# Patient Record
Sex: MALE | Race: WHITE | Employment: FULL TIME | URBAN - METROPOLITAN AREA
[De-identification: names, ages, dates, MRNs, and addresses within clinical notes are randomized per-mention and may not be internally consistent; named-entity substitution may affect disease eponyms.]

---

## 2018-12-30 ENCOUNTER — APPOINTMENT (OUTPATIENT)
Dept: GENERAL RADIOLOGY | Age: 53
End: 2018-12-30
Payer: COMMERCIAL

## 2018-12-30 ENCOUNTER — HOSPITAL ENCOUNTER (EMERGENCY)
Age: 53
Discharge: HOME | End: 2018-12-30
Attending: EMERGENCY MEDICINE
Payer: COMMERCIAL

## 2018-12-30 VITALS
WEIGHT: 240 LBS | HEIGHT: 72 IN | BODY MASS INDEX: 32.51 KG/M2 | OXYGEN SATURATION: 95 % | TEMPERATURE: 98.2 F | SYSTOLIC BLOOD PRESSURE: 122 MMHG | HEART RATE: 60 BPM | RESPIRATION RATE: 16 BRPM | DIASTOLIC BLOOD PRESSURE: 76 MMHG

## 2018-12-30 DIAGNOSIS — R07.9 CHEST PAIN, UNSPECIFIED TYPE: Primary | ICD-10-CM

## 2018-12-30 LAB
ALBUMIN SERPL-MCNC: 4.9 G/DL (ref 3.5–5.2)
ALP BLD-CCNC: 63 U/L (ref 40–129)
ALT SERPL-CCNC: 47 U/L (ref 0–40)
ANION GAP SERPL CALCULATED.3IONS-SCNC: 11 MMOL/L (ref 7–16)
AST SERPL-CCNC: 30 U/L (ref 0–39)
BASOPHILS ABSOLUTE: 0.05 E9/L (ref 0–0.2)
BASOPHILS RELATIVE PERCENT: 0.5 % (ref 0–2)
BILIRUB SERPL-MCNC: 0.4 MG/DL (ref 0–1.2)
BUN BLDV-MCNC: 13 MG/DL (ref 6–20)
CALCIUM SERPL-MCNC: 9.3 MG/DL (ref 8.6–10.2)
CHLORIDE BLD-SCNC: 105 MMOL/L (ref 98–107)
CO2: 25 MMOL/L (ref 22–29)
CREAT SERPL-MCNC: 0.8 MG/DL (ref 0.7–1.2)
D DIMER: <200 NG/ML DDU
EOSINOPHILS ABSOLUTE: 0.2 E9/L (ref 0.05–0.5)
EOSINOPHILS RELATIVE PERCENT: 2.1 % (ref 0–6)
GFR AFRICAN AMERICAN: >60
GFR NON-AFRICAN AMERICAN: >60 ML/MIN/1.73
GLUCOSE BLD-MCNC: 83 MG/DL (ref 74–99)
HCT VFR BLD CALC: 49 % (ref 37–54)
HEMOGLOBIN: 16.7 G/DL (ref 12.5–16.5)
IMMATURE GRANULOCYTES #: 0.03 E9/L
IMMATURE GRANULOCYTES %: 0.3 % (ref 0–5)
LYMPHOCYTES ABSOLUTE: 2.83 E9/L (ref 1.5–4)
LYMPHOCYTES RELATIVE PERCENT: 30 % (ref 20–42)
MCH RBC QN AUTO: 30.6 PG (ref 26–35)
MCHC RBC AUTO-ENTMCNC: 34.1 % (ref 32–34.5)
MCV RBC AUTO: 89.7 FL (ref 80–99.9)
MONOCYTES ABSOLUTE: 0.74 E9/L (ref 0.1–0.95)
MONOCYTES RELATIVE PERCENT: 7.8 % (ref 2–12)
NEUTROPHILS ABSOLUTE: 5.58 E9/L (ref 1.8–7.3)
NEUTROPHILS RELATIVE PERCENT: 59.3 % (ref 43–80)
PDW BLD-RTO: 14.3 FL (ref 11.5–15)
PLATELET # BLD: 229 E9/L (ref 130–450)
PMV BLD AUTO: 9.4 FL (ref 7–12)
POTASSIUM SERPL-SCNC: 4.1 MMOL/L (ref 3.5–5)
PRO-BNP: 11 PG/ML (ref 0–125)
RBC # BLD: 5.46 E12/L (ref 3.8–5.8)
SODIUM BLD-SCNC: 141 MMOL/L (ref 132–146)
TOTAL PROTEIN: 6.9 G/DL (ref 6.4–8.3)
TROPONIN: <0.01 NG/ML (ref 0–0.03)
TROPONIN: <0.01 NG/ML (ref 0–0.03)
WBC # BLD: 9.4 E9/L (ref 4.5–11.5)

## 2018-12-30 PROCEDURE — 96374 THER/PROPH/DIAG INJ IV PUSH: CPT

## 2018-12-30 PROCEDURE — 6360000002 HC RX W HCPCS: Performed by: EMERGENCY MEDICINE

## 2018-12-30 PROCEDURE — 85378 FIBRIN DEGRADE SEMIQUANT: CPT

## 2018-12-30 PROCEDURE — 80053 COMPREHEN METABOLIC PANEL: CPT

## 2018-12-30 PROCEDURE — 36415 COLL VENOUS BLD VENIPUNCTURE: CPT

## 2018-12-30 PROCEDURE — 83880 ASSAY OF NATRIURETIC PEPTIDE: CPT

## 2018-12-30 PROCEDURE — 6370000000 HC RX 637 (ALT 250 FOR IP): Performed by: EMERGENCY MEDICINE

## 2018-12-30 PROCEDURE — 84484 ASSAY OF TROPONIN QUANT: CPT

## 2018-12-30 PROCEDURE — 71045 X-RAY EXAM CHEST 1 VIEW: CPT

## 2018-12-30 PROCEDURE — 99284 EMERGENCY DEPT VISIT MOD MDM: CPT

## 2018-12-30 PROCEDURE — 85025 COMPLETE CBC W/AUTO DIFF WBC: CPT

## 2018-12-30 RX ORDER — ASPIRIN 325 MG
325 TABLET ORAL ONCE
Status: COMPLETED | OUTPATIENT
Start: 2018-12-30 | End: 2018-12-30

## 2018-12-30 RX ORDER — KETOROLAC TROMETHAMINE 30 MG/ML
15 INJECTION, SOLUTION INTRAMUSCULAR; INTRAVENOUS ONCE
Status: COMPLETED | OUTPATIENT
Start: 2018-12-30 | End: 2018-12-30

## 2018-12-30 RX ADMIN — ASPIRIN 325 MG: 325 TABLET ORAL at 19:12

## 2018-12-30 RX ADMIN — KETOROLAC TROMETHAMINE 15 MG: 30 INJECTION, SOLUTION INTRAMUSCULAR at 21:15

## 2018-12-30 ASSESSMENT — PAIN DESCRIPTION - LOCATION: LOCATION: CHEST

## 2018-12-30 ASSESSMENT — PAIN DESCRIPTION - DESCRIPTORS: DESCRIPTORS: ACHING

## 2018-12-30 ASSESSMENT — PAIN DESCRIPTION - PAIN TYPE: TYPE: ACUTE PAIN

## 2018-12-30 ASSESSMENT — PAIN SCALES - GENERAL
PAINLEVEL_OUTOF10: 0
PAINLEVEL_OUTOF10: 3
PAINLEVEL_OUTOF10: 2

## 2019-01-05 LAB
EKG ATRIAL RATE: 62 BPM
EKG ATRIAL RATE: 65 BPM
EKG P AXIS: 44 DEGREES
EKG P AXIS: 58 DEGREES
EKG P-R INTERVAL: 148 MS
EKG P-R INTERVAL: 150 MS
EKG Q-T INTERVAL: 392 MS
EKG Q-T INTERVAL: 392 MS
EKG QRS DURATION: 92 MS
EKG QRS DURATION: 92 MS
EKG QTC CALCULATION (BAZETT): 397 MS
EKG QTC CALCULATION (BAZETT): 407 MS
EKG R AXIS: -12 DEGREES
EKG R AXIS: -17 DEGREES
EKG T AXIS: 30 DEGREES
EKG T AXIS: 42 DEGREES
EKG VENTRICULAR RATE: 62 BPM
EKG VENTRICULAR RATE: 65 BPM

## 2019-09-21 ENCOUNTER — HOSPITAL ENCOUNTER (OUTPATIENT)
Age: 54
Discharge: HOME | End: 2019-09-23
Payer: COMMERCIAL

## 2019-09-21 LAB
ALT SERPL-CCNC: 36 U/L (ref 0–40)
ANION GAP SERPL CALCULATED.3IONS-SCNC: 16 MMOL/L (ref 7–16)
AST SERPL-CCNC: 30 U/L (ref 0–39)
BACTERIA: ABNORMAL /HPF
BASOPHILS ABSOLUTE: 0.05 E9/L (ref 0–0.2)
BASOPHILS RELATIVE PERCENT: 0.9 % (ref 0–2)
BILIRUBIN URINE: NEGATIVE
BLOOD, URINE: NEGATIVE
BUN BLDV-MCNC: 15 MG/DL (ref 6–20)
CALCIUM SERPL-MCNC: 9.2 MG/DL (ref 8.6–10.2)
CHLORIDE BLD-SCNC: 101 MMOL/L (ref 98–107)
CHOLESTEROL, TOTAL: 168 MG/DL (ref 0–199)
CLARITY: ABNORMAL
CO2: 24 MMOL/L (ref 22–29)
COLOR: YELLOW
CREAT SERPL-MCNC: 1.1 MG/DL (ref 0.7–1.2)
EOSINOPHILS ABSOLUTE: 0.15 E9/L (ref 0.05–0.5)
EOSINOPHILS RELATIVE PERCENT: 2.7 % (ref 0–6)
GFR AFRICAN AMERICAN: >60
GFR NON-AFRICAN AMERICAN: >60 ML/MIN/1.73
GLUCOSE BLD-MCNC: 108 MG/DL (ref 74–99)
GLUCOSE URINE: NEGATIVE MG/DL
HBA1C MFR BLD: 5.9 % (ref 4–5.6)
HCT VFR BLD CALC: 52.5 % (ref 37–54)
HDLC SERPL-MCNC: 54 MG/DL
HEMOGLOBIN: 16.9 G/DL (ref 12.5–16.5)
IMMATURE GRANULOCYTES #: 0.02 E9/L
IMMATURE GRANULOCYTES %: 0.4 % (ref 0–5)
KETONES, URINE: NEGATIVE MG/DL
LDL CHOLESTEROL CALCULATED: 89 MG/DL (ref 0–99)
LEUKOCYTE ESTERASE, URINE: NEGATIVE
LYMPHOCYTES ABSOLUTE: 1.84 E9/L (ref 1.5–4)
LYMPHOCYTES RELATIVE PERCENT: 32.7 % (ref 20–42)
MCH RBC QN AUTO: 29.8 PG (ref 26–35)
MCHC RBC AUTO-ENTMCNC: 32.2 % (ref 32–34.5)
MCV RBC AUTO: 92.4 FL (ref 80–99.9)
MONOCYTES ABSOLUTE: 0.61 E9/L (ref 0.1–0.95)
MONOCYTES RELATIVE PERCENT: 10.9 % (ref 2–12)
NEUTROPHILS ABSOLUTE: 2.95 E9/L (ref 1.8–7.3)
NEUTROPHILS RELATIVE PERCENT: 52.4 % (ref 43–80)
NITRITE, URINE: NEGATIVE
PDW BLD-RTO: 15 FL (ref 11.5–15)
PH UA: 5 (ref 5–9)
PLATELET # BLD: 240 E9/L (ref 130–450)
PMV BLD AUTO: 10 FL (ref 7–12)
POTASSIUM SERPL-SCNC: 4.2 MMOL/L (ref 3.5–5)
PROSTATE SPECIFIC ANTIGEN: 0.41 NG/ML (ref 0–4)
PROTEIN UA: NEGATIVE MG/DL
RBC # BLD: 5.68 E12/L (ref 3.8–5.8)
RBC UA: ABNORMAL /HPF (ref 0–2)
SODIUM BLD-SCNC: 141 MMOL/L (ref 132–146)
SPECIFIC GRAVITY UA: 1.02 (ref 1–1.03)
TRIGL SERPL-MCNC: 126 MG/DL (ref 0–149)
UROBILINOGEN, URINE: 0.2 E.U./DL
VITAMIN D 25-HYDROXY: 58 NG/ML (ref 30–100)
VLDLC SERPL CALC-MCNC: 25 MG/DL
WBC # BLD: 5.6 E9/L (ref 4.5–11.5)
WBC UA: ABNORMAL /HPF (ref 0–5)

## 2019-09-21 PROCEDURE — 84460 ALANINE AMINO (ALT) (SGPT): CPT

## 2019-09-21 PROCEDURE — 84403 ASSAY OF TOTAL TESTOSTERONE: CPT

## 2019-09-21 PROCEDURE — 85025 COMPLETE CBC W/AUTO DIFF WBC: CPT

## 2019-09-21 PROCEDURE — 36415 COLL VENOUS BLD VENIPUNCTURE: CPT

## 2019-09-21 PROCEDURE — 84450 TRANSFERASE (AST) (SGOT): CPT

## 2019-09-21 PROCEDURE — G0103 PSA SCREENING: HCPCS

## 2019-09-21 PROCEDURE — 81001 URINALYSIS AUTO W/SCOPE: CPT

## 2019-09-21 PROCEDURE — 80061 LIPID PANEL: CPT

## 2019-09-21 PROCEDURE — 82306 VITAMIN D 25 HYDROXY: CPT

## 2019-09-21 PROCEDURE — 80048 BASIC METABOLIC PNL TOTAL CA: CPT

## 2019-09-21 PROCEDURE — 83036 HEMOGLOBIN GLYCOSYLATED A1C: CPT

## 2019-09-21 PROCEDURE — 84270 ASSAY OF SEX HORMONE GLOBUL: CPT

## 2019-09-24 LAB
SEX HORMONE BINDING GLOBULIN: 31 NMOL/L (ref 11–80)
TESTOSTERONE FREE-NONMALE: 128.6 PG/ML (ref 47–244)
TESTOSTERONE TOTAL: 577 NG/DL (ref 220–1000)

## 2019-09-30 ENCOUNTER — OFFICE VISIT (OUTPATIENT)
Dept: FAMILY MEDICINE CLINIC | Age: 54
End: 2019-09-30
Payer: COMMERCIAL

## 2019-09-30 VITALS
DIASTOLIC BLOOD PRESSURE: 78 MMHG | BODY MASS INDEX: 30.75 KG/M2 | HEIGHT: 72 IN | TEMPERATURE: 98.7 F | OXYGEN SATURATION: 98 % | HEART RATE: 81 BPM | SYSTOLIC BLOOD PRESSURE: 124 MMHG | WEIGHT: 227 LBS

## 2019-09-30 DIAGNOSIS — R73.01 IMPAIRED FASTING GLUCOSE: ICD-10-CM

## 2019-09-30 DIAGNOSIS — K21.9 GASTROESOPHAGEAL REFLUX DISEASE, ESOPHAGITIS PRESENCE NOT SPECIFIED: ICD-10-CM

## 2019-09-30 DIAGNOSIS — M54.12 CERVICAL RADICULOPATHY: ICD-10-CM

## 2019-09-30 DIAGNOSIS — E34.9 TESTOSTERONE DEFICIENCY: ICD-10-CM

## 2019-09-30 DIAGNOSIS — Z80.42 FAMILY HISTORY OF PROSTATE CANCER: ICD-10-CM

## 2019-09-30 DIAGNOSIS — E78.2 MIXED HYPERLIPIDEMIA: Primary | ICD-10-CM

## 2019-09-30 PROCEDURE — 99214 OFFICE O/P EST MOD 30 MIN: CPT | Performed by: FAMILY MEDICINE

## 2019-09-30 PROCEDURE — G8417 CALC BMI ABV UP PARAM F/U: HCPCS | Performed by: FAMILY MEDICINE

## 2019-09-30 PROCEDURE — 1036F TOBACCO NON-USER: CPT | Performed by: FAMILY MEDICINE

## 2019-09-30 PROCEDURE — G8427 DOCREV CUR MEDS BY ELIG CLIN: HCPCS | Performed by: FAMILY MEDICINE

## 2019-09-30 PROCEDURE — 3017F COLORECTAL CA SCREEN DOC REV: CPT | Performed by: FAMILY MEDICINE

## 2019-09-30 PROCEDURE — 93000 ELECTROCARDIOGRAM COMPLETE: CPT | Performed by: FAMILY MEDICINE

## 2019-09-30 RX ORDER — BACLOFEN 10 MG/1
10 TABLET ORAL 4 TIMES DAILY
Qty: 120 TABLET | Refills: 11 | Status: SHIPPED
Start: 2019-09-30 | End: 2020-03-19 | Stop reason: SDUPTHER

## 2019-09-30 RX ORDER — FAMOTIDINE 20 MG/1
20 TABLET, FILM COATED ORAL DAILY
Qty: 90 TABLET | Refills: 3 | Status: SHIPPED
Start: 2019-09-30 | End: 2020-05-08

## 2019-09-30 RX ORDER — TESTOSTERONE CYPIONATE 100 MG/ML
100 INJECTION, SOLUTION INTRAMUSCULAR
COMMUNITY
End: 2019-09-30 | Stop reason: SDUPTHER

## 2019-09-30 RX ORDER — ROSUVASTATIN CALCIUM 5 MG/1
5 TABLET, COATED ORAL DAILY
Qty: 90 TABLET | Refills: 1 | Status: SHIPPED
Start: 2019-09-30 | End: 2020-03-19 | Stop reason: SDUPTHER

## 2019-09-30 ASSESSMENT — ENCOUNTER SYMPTOMS
ABDOMINAL PAIN: 0
WHEEZING: 0
EYE ITCHING: 0
EYE REDNESS: 0
EYE PAIN: 0
CHEST TIGHTNESS: 0
ABDOMINAL DISTENTION: 0
DIARRHEA: 0
BLOOD IN STOOL: 0
BACK PAIN: 0
COUGH: 0
NAUSEA: 0
COLOR CHANGE: 0
SORE THROAT: 0
PHOTOPHOBIA: 0
CONSTIPATION: 0
SHORTNESS OF BREATH: 0
SINUS PAIN: 0
VOMITING: 0
SINUS PRESSURE: 0

## 2019-09-30 ASSESSMENT — PATIENT HEALTH QUESTIONNAIRE - PHQ9
SUM OF ALL RESPONSES TO PHQ QUESTIONS 1-9: 0
SUM OF ALL RESPONSES TO PHQ9 QUESTIONS 1 & 2: 0
2. FEELING DOWN, DEPRESSED OR HOPELESS: 0
SUM OF ALL RESPONSES TO PHQ QUESTIONS 1-9: 0
1. LITTLE INTEREST OR PLEASURE IN DOING THINGS: 0

## 2019-09-30 NOTE — PROGRESS NOTES
Activity    Alcohol use: Yes     Alcohol/week: 0.0 standard drinks     Comment: socially    Drug use: No    Sexual activity: Not on file   Lifestyle    Physical activity:     Days per week: Not on file     Minutes per session: Not on file    Stress: Not on file   Relationships    Social connections:     Talks on phone: Not on file     Gets together: Not on file     Attends [employer]ology service: Not on file     Active member of club or organization: Not on file     Attends meetings of clubs or organizations: Not on file     Relationship status: Not on file    Intimate partner violence:     Fear of current or ex partner: Not on file     Emotionally abused: Not on file     Physically abused: Not on file     Forced sexual activity: Not on file   Other Topics Concern    Not on file   Social History Narrative    Not on file       Vitals:    09/30/19 1613   BP: 124/78   Pulse: 81   Temp: 98.7 °F (37.1 °C)   SpO2: 98%   Weight: 227 lb (103 kg)   Height: 6' (1.829 m)       Exam:  Physical Exam   Constitutional: He is oriented to person, place, and time. He appears well-developed and well-nourished. obesity   HENT:   Head: Normocephalic and atraumatic. Right Ear: External ear normal.   Left Ear: External ear normal.   Nose: Nose normal.   Mouth/Throat: Oropharynx is clear and moist.   Eyes: Pupils are equal, round, and reactive to light. Conjunctivae and EOM are normal.   Neck: Normal range of motion. Neck supple. Cardiovascular: Normal rate, regular rhythm, normal heart sounds and intact distal pulses. Pulmonary/Chest: Effort normal and breath sounds normal.   Abdominal: Soft. Bowel sounds are normal.   Musculoskeletal: Normal range of motion. Neurological: He is alert and oriented to person, place, and time. Skin: Skin is warm and dry. No rash noted. Psychiatric: He has a normal mood and affect.        Assessment and Plan:    Problem List        Digestive    GERD (gastroesophageal reflux disease) Relevant Medications    famotidine (PEPCID) 20 MG tablet    Other Relevant Orders    Comprehensive Metabolic Panel       Endocrine    Impaired fasting glucose    Relevant Orders    CBC Auto Differential    Hemoglobin A1C    Microalbumin / Creatinine Urine Ratio       Nervous and Auditory    Cervical radiculopathy    Relevant Medications    baclofen (LIORESAL) 10 MG tablet       Other    Hyperlipidemia - Primary    Relevant Medications    aspirin 81 MG tablet    aspirin tablet 325 mg (Completed)    rosuvastatin (CRESTOR) 5 MG tablet    Other Relevant Orders    CBC Auto Differential    Comprehensive Metabolic Panel    Lipid Panel    Microalbumin / Creatinine Urine Ratio    Urinalysis    Family history of prostate cancer    Testosterone deficiency    Relevant Orders    Testosterone, free, total      utd colonoscopy  Has had flu vaccine  utd psa  Reviewed all bw as discussed above  change zantac to pepcid due to recent study  Refilled testosterone, oarrs reviewed  Recommended diet and exercise     Return in about 6 months (around 3/30/2020) for fasting bw prior.       Orders Placed This Encounter   Procedures    CBC Auto Differential     Standing Status:   Future     Standing Expiration Date:   9/30/2020    Comprehensive Metabolic Panel     Standing Status:   Future     Standing Expiration Date:   9/30/2020    Lipid Panel     Standing Status:   Future     Standing Expiration Date:   9/30/2020     Order Specific Question:   Is Patient Fasting?/# of Hours     Answer:   12    Hemoglobin A1C     Standing Status:   Future     Standing Expiration Date:   9/30/2020    Microalbumin / Creatinine Urine Ratio     Standing Status:   Future     Standing Expiration Date:   9/30/2020    Urinalysis     Standing Status:   Future     Standing Expiration Date:   9/30/2020    Testosterone, free, total     Standing Status:   Future     Standing Expiration Date:   9/30/2020       Taurus Baum MD  9/30/2019  4:32 PM

## 2019-10-01 RX ORDER — TESTOSTERONE CYPIONATE 100 MG/ML
100 INJECTION, SOLUTION INTRAMUSCULAR
Qty: 2 ML | Refills: 5 | Status: SHIPPED
Start: 2019-10-01 | End: 2020-03-19 | Stop reason: SDUPTHER

## 2020-03-14 ENCOUNTER — HOSPITAL ENCOUNTER (OUTPATIENT)
Age: 55
Discharge: HOME | End: 2020-03-16
Payer: COMMERCIAL

## 2020-03-14 LAB
ALBUMIN SERPL-MCNC: 4.6 G/DL (ref 3.5–5.2)
ALP BLD-CCNC: 57 U/L (ref 40–129)
ALT SERPL-CCNC: 33 U/L (ref 0–40)
ANION GAP SERPL CALCULATED.3IONS-SCNC: 13 MMOL/L (ref 7–16)
AST SERPL-CCNC: 23 U/L (ref 0–39)
BASOPHILS ABSOLUTE: 0.05 E9/L (ref 0–0.2)
BASOPHILS RELATIVE PERCENT: 0.9 % (ref 0–2)
BILIRUB SERPL-MCNC: 0.4 MG/DL (ref 0–1.2)
BILIRUBIN URINE: NEGATIVE
BLOOD, URINE: NEGATIVE
BUN BLDV-MCNC: 14 MG/DL (ref 6–20)
CALCIUM SERPL-MCNC: 9.8 MG/DL (ref 8.6–10.2)
CHLORIDE BLD-SCNC: 101 MMOL/L (ref 98–107)
CHOLESTEROL, TOTAL: 167 MG/DL (ref 0–199)
CLARITY: CLEAR
CO2: 28 MMOL/L (ref 22–29)
COLOR: YELLOW
CREAT SERPL-MCNC: 1.1 MG/DL (ref 0.7–1.2)
CREATININE URINE: 197 MG/DL (ref 40–278)
EOSINOPHILS ABSOLUTE: 0.17 E9/L (ref 0.05–0.5)
EOSINOPHILS RELATIVE PERCENT: 2.9 % (ref 0–6)
GFR AFRICAN AMERICAN: >60
GFR NON-AFRICAN AMERICAN: >60 ML/MIN/1.73
GLUCOSE BLD-MCNC: 108 MG/DL (ref 74–99)
GLUCOSE URINE: NEGATIVE MG/DL
HBA1C MFR BLD: 5.9 % (ref 4–5.6)
HCT VFR BLD CALC: 53.2 % (ref 37–54)
HDLC SERPL-MCNC: 52 MG/DL
HEMOGLOBIN: 17 G/DL (ref 12.5–16.5)
IMMATURE GRANULOCYTES #: 0.02 E9/L
IMMATURE GRANULOCYTES %: 0.3 % (ref 0–5)
KETONES, URINE: NEGATIVE MG/DL
LDL CHOLESTEROL CALCULATED: 85 MG/DL (ref 0–99)
LEUKOCYTE ESTERASE, URINE: NEGATIVE
LYMPHOCYTES ABSOLUTE: 1.99 E9/L (ref 1.5–4)
LYMPHOCYTES RELATIVE PERCENT: 34.1 % (ref 20–42)
MCH RBC QN AUTO: 29.5 PG (ref 26–35)
MCHC RBC AUTO-ENTMCNC: 32 % (ref 32–34.5)
MCV RBC AUTO: 92.4 FL (ref 80–99.9)
MICROALBUMIN UR-MCNC: <12 MG/L
MICROALBUMIN/CREAT UR-RTO: ABNORMAL (ref 0–30)
MONOCYTES ABSOLUTE: 0.6 E9/L (ref 0.1–0.95)
MONOCYTES RELATIVE PERCENT: 10.3 % (ref 2–12)
NEUTROPHILS ABSOLUTE: 3.01 E9/L (ref 1.8–7.3)
NEUTROPHILS RELATIVE PERCENT: 51.5 % (ref 43–80)
NITRITE, URINE: NEGATIVE
PDW BLD-RTO: 14.2 FL (ref 11.5–15)
PH UA: 5 (ref 5–9)
PLATELET # BLD: 225 E9/L (ref 130–450)
PMV BLD AUTO: 10 FL (ref 7–12)
POTASSIUM SERPL-SCNC: 4.3 MMOL/L (ref 3.5–5)
PROTEIN UA: NEGATIVE MG/DL
RBC # BLD: 5.76 E12/L (ref 3.8–5.8)
SODIUM BLD-SCNC: 142 MMOL/L (ref 132–146)
SPECIFIC GRAVITY UA: >=1.03 (ref 1–1.03)
TOTAL PROTEIN: 6.8 G/DL (ref 6.4–8.3)
TRIGL SERPL-MCNC: 151 MG/DL (ref 0–149)
UROBILINOGEN, URINE: 0.2 E.U./DL
VLDLC SERPL CALC-MCNC: 30 MG/DL
WBC # BLD: 5.8 E9/L (ref 4.5–11.5)

## 2020-03-14 PROCEDURE — 85025 COMPLETE CBC W/AUTO DIFF WBC: CPT

## 2020-03-14 PROCEDURE — 81003 URINALYSIS AUTO W/O SCOPE: CPT

## 2020-03-14 PROCEDURE — 82044 UR ALBUMIN SEMIQUANTITATIVE: CPT

## 2020-03-14 PROCEDURE — 84270 ASSAY OF SEX HORMONE GLOBUL: CPT

## 2020-03-14 PROCEDURE — 80061 LIPID PANEL: CPT

## 2020-03-14 PROCEDURE — 82570 ASSAY OF URINE CREATININE: CPT

## 2020-03-14 PROCEDURE — 83036 HEMOGLOBIN GLYCOSYLATED A1C: CPT

## 2020-03-14 PROCEDURE — 80053 COMPREHEN METABOLIC PANEL: CPT

## 2020-03-14 PROCEDURE — 84403 ASSAY OF TOTAL TESTOSTERONE: CPT

## 2020-03-14 PROCEDURE — 36415 COLL VENOUS BLD VENIPUNCTURE: CPT

## 2020-03-17 LAB
SEX HORMONE BINDING GLOBULIN: 29 NMOL/L (ref 11–80)
TESTOSTERONE FREE-NONMALE: 64.7 PG/ML (ref 47–244)
TESTOSTERONE TOTAL: 305 NG/DL (ref 220–1000)

## 2020-03-19 RX ORDER — ROSUVASTATIN CALCIUM 5 MG/1
5 TABLET, COATED ORAL DAILY
Qty: 90 TABLET | Refills: 1 | Status: SHIPPED | OUTPATIENT
Start: 2020-03-19

## 2020-03-19 RX ORDER — BACLOFEN 10 MG/1
10 TABLET ORAL 4 TIMES DAILY
Qty: 120 TABLET | Refills: 11 | Status: SHIPPED | OUTPATIENT
Start: 2020-03-19

## 2020-03-19 RX ORDER — TESTOSTERONE CYPIONATE 100 MG/ML
100 INJECTION, SOLUTION INTRAMUSCULAR
Qty: 2 ML | Refills: 5 | Status: SHIPPED | OUTPATIENT
Start: 2020-03-19 | End: 2021-05-03

## 2020-03-19 NOTE — TELEPHONE ENCOUNTER
I sent what was que'd. I looked through his notes and it's been several years since he's been on the omeprazole - I couldn't find it. Does he mean randitidine?

## 2020-03-19 NOTE — TELEPHONE ENCOUNTER
Patient needs pended meds refilled. He is asking for Omeprazole instead of the Famotidine he states the Omeprazole does not work for him.

## 2020-03-20 ENCOUNTER — TELEPHONE (OUTPATIENT)
Dept: FAMILY MEDICINE CLINIC | Age: 55
End: 2020-03-20

## 2020-03-20 RX ORDER — OMEPRAZOLE 10 MG/1
10 CAPSULE, DELAYED RELEASE ORAL DAILY
COMMUNITY
End: 2020-03-20 | Stop reason: SDUPTHER

## 2020-03-20 RX ORDER — OMEPRAZOLE 10 MG/1
10 CAPSULE, DELAYED RELEASE ORAL DAILY
Qty: 90 CAPSULE | Refills: 0 | Status: SHIPPED
Start: 2020-03-20 | End: 2020-05-08

## 2020-03-20 RX ORDER — OMEPRAZOLE 10 MG/1
10 CAPSULE, DELAYED RELEASE ORAL DAILY
Qty: 30 CAPSULE | Refills: 0 | Status: SHIPPED
Start: 2020-03-20 | End: 2020-03-20

## 2020-05-08 RX ORDER — NICOTINE POLACRILEX 4 MG/1
20 GUM, CHEWING ORAL DAILY
COMMUNITY
End: 2020-05-08 | Stop reason: SDUPTHER

## 2020-05-08 RX ORDER — NICOTINE POLACRILEX 4 MG/1
20 GUM, CHEWING ORAL DAILY
Qty: 90 TABLET | Refills: 0 | Status: SHIPPED
Start: 2020-05-08 | End: 2020-08-04

## 2020-05-12 ENCOUNTER — TELEPHONE (OUTPATIENT)
Dept: FAMILY MEDICINE CLINIC | Age: 55
End: 2020-05-12

## 2020-05-12 NOTE — TELEPHONE ENCOUNTER
Patient notified. He does not wish to schedule at this time. States he will schedule again when he is due for more refills.

## 2020-08-04 RX ORDER — OMEPRAZOLE 20 MG/1
CAPSULE, DELAYED RELEASE ORAL
Qty: 90 CAPSULE | Refills: 0 | Status: SHIPPED | OUTPATIENT
Start: 2020-08-04

## 2020-08-17 ENCOUNTER — OFFICE VISIT (OUTPATIENT)
Dept: PODIATRY | Age: 55
End: 2020-08-17
Payer: COMMERCIAL

## 2020-08-17 VITALS
DIASTOLIC BLOOD PRESSURE: 74 MMHG | HEIGHT: 72 IN | SYSTOLIC BLOOD PRESSURE: 138 MMHG | TEMPERATURE: 98.2 F | BODY MASS INDEX: 31.02 KG/M2 | WEIGHT: 229 LBS

## 2020-08-17 PROCEDURE — 99203 OFFICE O/P NEW LOW 30 MIN: CPT | Performed by: PODIATRIST

## 2020-08-17 PROCEDURE — 20550 NJX 1 TENDON SHEATH/LIGAMENT: CPT | Performed by: PODIATRIST

## 2020-08-17 PROCEDURE — L4396 STATIC OR DYNAMI AFO PRE CST: HCPCS | Performed by: PODIATRIST

## 2020-08-17 RX ORDER — BUPIVACAINE HYDROCHLORIDE 5 MG/ML
1 INJECTION, SOLUTION PERINEURAL ONCE
Status: COMPLETED | OUTPATIENT
Start: 2020-08-17 | End: 2020-08-17

## 2020-08-17 RX ORDER — BETAMETHASONE SODIUM PHOSPHATE AND BETAMETHASONE ACETATE 3; 3 MG/ML; MG/ML
6 INJECTION, SUSPENSION INTRA-ARTICULAR; INTRALESIONAL; INTRAMUSCULAR; SOFT TISSUE ONCE
Status: COMPLETED | OUTPATIENT
Start: 2020-08-17 | End: 2020-08-17

## 2020-08-17 RX ADMIN — BETAMETHASONE SODIUM PHOSPHATE AND BETAMETHASONE ACETATE 6 MG: 3; 3 INJECTION, SUSPENSION INTRA-ARTICULAR; INTRALESIONAL; INTRAMUSCULAR; SOFT TISSUE at 17:27

## 2020-08-17 RX ADMIN — BUPIVACAINE HYDROCHLORIDE 5 MG: 5 INJECTION, SOLUTION PERINEURAL at 17:27

## 2020-08-17 NOTE — PROGRESS NOTES
[facility]  [address] [place] [id number]  Dept: [phone]  Dept Fax: [phone]    NEW PATIENT PROGRESS NOTE  Date of patient's visit: 8/17/2020  Patient's Name:  Dasia Hummel YOB: 1965            Patient Care Team:  Ivet Joel MD as PCP - Anca Frias MD as PCP - [facility] Empaneled Provider  CORIE Bravo as Nurse Practitioner (Neurology)        Chief Complaint   Patient presents with   Omelia Dale New Doctor    Foot Pain     right heel referred himself        HPI  HPI:   Dasia Hummel is a 62 y.o. male who presents to the office today complaining of right heel pain. This new patient is seen today regarding pain on the bottom of the right heel. Patient states that started 2 months ago. No trauma noted. Patient stands at work and off ladders. Pain mostly after standing and in the early morning. Patient has tried icing with no relief. Allergies   Allergen Reactions    Levofloxacin        Past Medical History:   Diagnosis Date    Cervical radiculopathy     Family history of prostate cancer     GERD (gastroesophageal reflux disease)     Hyperlipidemia     Impaired fasting glucose     Testosterone deficiency     TIA (transient ischemic attack) 2011       Prior to Admission medications    Medication Sig Start Date End Date Taking? Authorizing Provider   omeprazole (PRILOSEC) 20 MG delayed release capsule take 1 capsule by mouth once daily 8/4/20  Yes CORIE Heller CNP   baclofen (LIORESAL) 10 MG tablet Take 1 tablet by mouth 4 times daily 3/19/20  Yes CORIE Cali CNP   rosuvastatin (CRESTOR) 5 MG tablet Take 1 tablet by mouth daily 3/19/20  Yes CORIE Cali CNP   testosterone cypionate (DEPOTESTOTERONE CYPIONATE) 100 MG/ML injection Inject 1 mL into the muscle every 14 days for 180 days.  3/19/20 9/15/20 Yes CORIE Solitario CNP   Omega-3 Fatty Acids (FISH OIL) 1200 MG CAPS Take  by mouth daily. Yes Historical Provider, MD   aspirin 81 MG tablet Take 81 mg by mouth daily. Yes Historical Provider, MD   Multiple Vitamins-Minerals (MULTIVITAL PO) Take  by mouth daily. Yes Historical Provider, MD       Past Surgical History:   Procedure Laterality Date    CARDIAC VALVE REPLACEMENT      COLONOSCOPY      REFRACTIVE SURGERY      TONSILLECTOMY      VASECTOMY         Family History   Problem Relation Age of Onset    Cancer Father        Social History     Tobacco Use    Smoking status: Former Smoker    Smokeless tobacco: Never Used   Substance Use Topics    Alcohol use: Yes     Alcohol/week: 0.0 standard drinks     Comment: socially       Review of Systems    Review of Systems:   History obtained from chart review and the patient  General ROS: negative for - chills, fatigue, fever, night sweats or weight gain  Constitutional: Negative for chills, diaphoresis, fatigue, fever and unexpected weight change. Musculoskeletal: Positive for . Neurological ROS: negative for - behavioral changes, confusion, headaches or seizures. Negative for weakness and numbness. Dermatological ROS: negative for - mole changes, rash  Cardiovascular: Negative for leg swelling. Gastrointestinal: Negative for constipation, diarrhea, nausea and vomiting. Lower Extremity Physical Examination:   Vitals:   Vitals:    08/17/20 1643   BP: 138/74   Temp: 98.2 °F (36.8 °C)        Foot Exam    General  General Appearance: appears stated age and healthy   Orientation: alert and oriented to person, place, and time   Affect: appropriate       Right Foot/Ankle     Inspection and Palpation  Tenderness: calcaneus tenderness, bony tenderness and plantar fascia   Skin Exam: skin intact;      Neurovascular  Dorsalis pedis: 3+  Posterior tibial: 3+  Saphenous nerve sensation: normal  Tibial nerve sensation: normal  Superficial peroneal nerve sensation: normal  Deep peroneal passive and active range of motion stretches and strengthening bilateral plantar fascia and Achilles tendons. Current condition and treatment options discussed in detail. Discussed conservative and surgical options with the patient. Treatment options today consisted of verbal and written instructions given to patient. Contact office with any questions/problems/concerns. RTC in 2week(s). 8/17/2020    Electronically signed by Jaymes Rinne, DPM on 8/17/2020 at 5:29 PM  8/17/2020    The patient was dispensed a night splint. It is medically necessary and within the standard of care for the patient's diagnosis. Its purpose is to stretch the gastrocnemius complex as well as the plantar fascial ligament. This in turn allows for a decrease in the amount of equinus deformity present. It was fitted and adjusted by myself. The patient was instructed on its proper application and use. The patient was also instructed to watch for areas of rubbing, irritation, blister formation, or any other signs of abnormal pressure. If this occurs, the patient is to contact the office immediately. The ABN was reviewed and signed by the patient prior to leaving this appointment.

## 2020-09-14 ENCOUNTER — OFFICE VISIT (OUTPATIENT)
Dept: PODIATRY | Age: 55
End: 2020-09-14
Payer: COMMERCIAL

## 2020-09-14 VITALS
TEMPERATURE: 98.2 F | HEIGHT: 72 IN | BODY MASS INDEX: 30.48 KG/M2 | DIASTOLIC BLOOD PRESSURE: 92 MMHG | WEIGHT: 225 LBS | SYSTOLIC BLOOD PRESSURE: 152 MMHG

## 2020-09-14 PROCEDURE — 99213 OFFICE O/P EST LOW 20 MIN: CPT | Performed by: PODIATRIST

## 2020-09-14 RX ORDER — PREDNISONE 10 MG/1
10 TABLET ORAL DAILY
Qty: 18 TABLET | Refills: 1 | Status: SHIPPED | OUTPATIENT
Start: 2020-09-14 | End: 2020-09-23

## 2020-09-14 NOTE — PROGRESS NOTES
20  Kelly Arthur : 1965 Sex: male  Age: 60 y.o. Patient was referred by Ann-Marie Escoto MD    Chief Complaint   Patient presents with    Foot Pain     right heel had inj feels better        SUBJECTIVE pt seen for right heel pain   95% better   HPI  Review of Systems  Const: Denies constitutional symptoms  Musculo: Denies symptoms other than stated above  Skin: Denies symptoms other than stated above       Current Outpatient Medications:     predniSONE (DELTASONE) 10 MG tablet, Take 1 tablet by mouth daily for 9 days 3 tabs  Qd x 3  days   2 tabs qd x 3 days  1 tab qd  X 3 days, Disp: 18 tablet, Rfl: 1    omeprazole (PRILOSEC) 20 MG delayed release capsule, take 1 capsule by mouth once daily, Disp: 90 capsule, Rfl: 0    baclofen (LIORESAL) 10 MG tablet, Take 1 tablet by mouth 4 times daily, Disp: 120 tablet, Rfl: 11    rosuvastatin (CRESTOR) 5 MG tablet, Take 1 tablet by mouth daily, Disp: 90 tablet, Rfl: 1    testosterone cypionate (DEPOTESTOTERONE CYPIONATE) 100 MG/ML injection, Inject 1 mL into the muscle every 14 days for 180 days. , Disp: 2 mL, Rfl: 5    Omega-3 Fatty Acids (FISH OIL) 1200 MG CAPS, Take  by mouth daily. , Disp: , Rfl:     aspirin 81 MG tablet, Take 81 mg by mouth daily. , Disp: , Rfl:     Multiple Vitamins-Minerals (MULTIVITAL PO), Take  by mouth daily. , Disp: , Rfl:   Allergies   Allergen Reactions    Levofloxacin        Past Medical History:   Diagnosis Date    Cervical radiculopathy     Family history of prostate cancer     GERD (gastroesophageal reflux disease)     Hyperlipidemia     Impaired fasting glucose     Testosterone deficiency     TIA (transient ischemic attack)      Past Surgical History:   Procedure Laterality Date    CARDIAC VALVE REPLACEMENT      COLONOSCOPY      REFRACTIVE SURGERY      TONSILLECTOMY      VASECTOMY       Family History   Problem Relation Age of Onset    Cancer Father      Social History     Socioeconomic History    tibial: 3+  Saphenous nerve sensation: normal  Tibial nerve sensation: normal  Superficial peroneal nerve sensation: normal  Deep peroneal nerve sensation: normal  Sural nerve sensation: normal             Ortho Exam    Vascular: pulses  dp  pt    Capillary Refill Time:   Hair growth  Skin:    Edema:    Neurologic:      Musculoskeletal/ Orthopedic examination:  Pain inferior aspect right heel     NAIL   Web space   Derm:          Assessment and Plan:icing   Night splint    Power step orthotics    Prednisone   As directed     Cameron was seen today for foot pain. Diagnoses and all orders for this visit:    Pain in right foot    Plantar fascial fibromatosis    Calcaneal spur of both feet    Other orders  -     predniSONE (DELTASONE) 10 MG tablet; Take 1 tablet by mouth daily for 9 days 3 tabs  Qd x 3  days   2 tabs qd x 3 days  1 tab qd  X 3 days        Return in about 1 month (around 10/14/2020). Seen By:  Jose A Gdooy DPM      Document was created using voice recognition software. Note was reviewed, however may contain grammatical errors.

## 2020-10-19 ENCOUNTER — OFFICE VISIT (OUTPATIENT)
Dept: PODIATRY | Age: 55
End: 2020-10-19
Payer: COMMERCIAL

## 2020-10-19 VITALS — SYSTOLIC BLOOD PRESSURE: 140 MMHG | DIASTOLIC BLOOD PRESSURE: 80 MMHG | TEMPERATURE: 97.8 F

## 2020-10-19 PROCEDURE — 99213 OFFICE O/P EST LOW 20 MIN: CPT | Performed by: PODIATRIST

## 2020-10-19 NOTE — PROGRESS NOTES
10/19/20  Izaiah Medel : 1965 Sex: male  Age: 60 y.o. Patient was referred by Sid Renee MD    Chief Complaint   Patient presents with    Foot Pain     right heel pain had one inj and prednisone last visit he is doing better but not 100% using night splint       SUBJECTIVE he is seen today for the follow-up of right heel pain he had 1injection was on prednisone patient is doing excellent today  HPI  Review of Systems  Const: Denies constitutional symptoms  Musculo: Denies symptoms other than stated above  Skin: Denies symptoms other than stated above       Current Outpatient Medications:     omeprazole (PRILOSEC) 20 MG delayed release capsule, take 1 capsule by mouth once daily, Disp: 90 capsule, Rfl: 0    baclofen (LIORESAL) 10 MG tablet, Take 1 tablet by mouth 4 times daily, Disp: 120 tablet, Rfl: 11    rosuvastatin (CRESTOR) 5 MG tablet, Take 1 tablet by mouth daily, Disp: 90 tablet, Rfl: 1    testosterone cypionate (DEPOTESTOTERONE CYPIONATE) 100 MG/ML injection, Inject 1 mL into the muscle every 14 days for 180 days. , Disp: 2 mL, Rfl: 5    Omega-3 Fatty Acids (FISH OIL) 1200 MG CAPS, Take  by mouth daily. , Disp: , Rfl:     aspirin 81 MG tablet, Take 81 mg by mouth daily. , Disp: , Rfl:     Multiple Vitamins-Minerals (MULTIVITAL PO), Take  by mouth daily. , Disp: , Rfl:   Allergies   Allergen Reactions    Levofloxacin        Past Medical History:   Diagnosis Date    Cervical radiculopathy     Family history of prostate cancer     GERD (gastroesophageal reflux disease)     Hyperlipidemia     Impaired fasting glucose     Testosterone deficiency     TIA (transient ischemic attack)      Past Surgical History:   Procedure Laterality Date    CARDIAC VALVE REPLACEMENT      COLONOSCOPY      REFRACTIVE SURGERY      TONSILLECTOMY      VASECTOMY       Family History   Problem Relation Age of Onset    Cancer Father      Social History     Socioeconomic History    Marital status:      Spouse name: Not on file    Number of children: Not on file    Years of education: Not on file    Highest education level: Not on file   Occupational History    Not on file   Social Needs    Financial resource strain: Not on file    Food insecurity     Worry: Not on file     Inability: Not on file    Transportation needs     Medical: Not on file     Non-medical: Not on file   Tobacco Use    Smoking status: Former Smoker    Smokeless tobacco: Never Used   Substance and Sexual Activity    Alcohol use:  Yes     Alcohol/week: 0.0 standard drinks     Comment: socially    Drug use: No    Sexual activity: Not on file   Lifestyle    Physical activity     Days per week: Not on file     Minutes per session: Not on file    Stress: Not on file   Relationships    Social connections     Talks on phone: Not on file     Gets together: Not on file     Attends [employer]an service: Not on file     Active member of club or organization: Not on file     Attends meetings of clubs or organizations: Not on file     Relationship status: Not on file    Intimate partner violence     Fear of current or ex partner: Not on file     Emotionally abused: Not on file     Physically abused: Not on file     Forced sexual activity: Not on file   Other Topics Concern    Not on file   Social History Narrative    Not on file       Vitals:    10/19/20 1643   BP: (!) 140/80   Temp: 97.8 °F (36.6 °C)   TempSrc: Temporal   Weight: Comment: refused       Focused Lower Extremity Physical Exam:  Vitals:    10/19/20 1643   BP: (!) 140/80   Temp: 97.8 °F (36.6 °C)        Foot Exam    General  General Appearance: appears stated age and healthy   Orientation: alert and oriented to person, place, and time       Right Foot/Ankle     Inspection and Palpation  Tenderness: none   Swelling: none              Ortho Exam    Vascular: pulses  dp  pt  PALAPBLE  Capillary Refill Time:   Hair growth  Skin:    Edema:    Neurologic:

## 2021-05-03 ENCOUNTER — OFFICE VISIT (OUTPATIENT)
Dept: PODIATRY | Age: 56
End: 2021-05-03
Payer: COMMERCIAL

## 2021-05-03 VITALS
DIASTOLIC BLOOD PRESSURE: 98 MMHG | SYSTOLIC BLOOD PRESSURE: 148 MMHG | TEMPERATURE: 97.1 F | BODY MASS INDEX: 31.74 KG/M2 | WEIGHT: 234 LBS

## 2021-05-03 DIAGNOSIS — M72.2 PLANTAR FASCIAL FIBROMATOSIS: Primary | ICD-10-CM

## 2021-05-03 DIAGNOSIS — M77.32 CALCANEAL SPUR OF BOTH FEET: ICD-10-CM

## 2021-05-03 DIAGNOSIS — M77.31 CALCANEAL SPUR OF BOTH FEET: ICD-10-CM

## 2021-05-03 DIAGNOSIS — M79.671 PAIN IN RIGHT FOOT: ICD-10-CM

## 2021-05-03 PROCEDURE — 20550 NJX 1 TENDON SHEATH/LIGAMENT: CPT | Performed by: PODIATRIST

## 2021-05-03 PROCEDURE — 99213 OFFICE O/P EST LOW 20 MIN: CPT | Performed by: PODIATRIST

## 2021-05-03 RX ORDER — BUPIVACAINE HYDROCHLORIDE 5 MG/ML
1 INJECTION, SOLUTION PERINEURAL ONCE
Status: COMPLETED | OUTPATIENT
Start: 2021-05-03 | End: 2021-05-03

## 2021-05-03 RX ORDER — DICLOFENAC POTASSIUM 50 MG/1
50 TABLET, FILM COATED ORAL 2 TIMES DAILY
Qty: 120 TABLET | Refills: 0 | Status: SHIPPED | OUTPATIENT
Start: 2021-05-03 | End: 2021-07-02

## 2021-05-03 RX ORDER — BETAMETHASONE SODIUM PHOSPHATE AND BETAMETHASONE ACETATE 3; 3 MG/ML; MG/ML
6 INJECTION, SUSPENSION INTRA-ARTICULAR; INTRALESIONAL; INTRAMUSCULAR; SOFT TISSUE ONCE
Status: COMPLETED | OUTPATIENT
Start: 2021-05-03 | End: 2021-05-03

## 2021-05-03 RX ADMIN — BUPIVACAINE HYDROCHLORIDE 5 MG: 5 INJECTION, SOLUTION PERINEURAL at 17:12

## 2021-05-03 RX ADMIN — BETAMETHASONE SODIUM PHOSPHATE AND BETAMETHASONE ACETATE 6 MG: 3; 3 INJECTION, SUSPENSION INTRA-ARTICULAR; INTRALESIONAL; INTRAMUSCULAR; SOFT TISSUE at 17:12

## 2021-05-03 NOTE — PROGRESS NOTES
COLONOSCOPY      REFRACTIVE SURGERY      TONSILLECTOMY      VASECTOMY       Family History   Problem Relation Age of Onset    Cancer Father      Social History     Socioeconomic History    Marital status:      Spouse name: Not on file    Number of children: Not on file    Years of education: Not on file    Highest education level: Not on file   Occupational History    Not on file   Social Needs    Financial resource strain: Not on file    Food insecurity     Worry: Not on file     Inability: Not on file    Transportation needs     Medical: Not on file     Non-medical: Not on file   Tobacco Use    Smoking status: Former Smoker    Smokeless tobacco: Never Used   Substance and Sexual Activity    Alcohol use:  Yes     Alcohol/week: 0.0 standard drinks     Comment: socially    Drug use: No    Sexual activity: Not on file   Lifestyle    Physical activity     Days per week: Not on file     Minutes per session: Not on file    Stress: Not on file   Relationships    Social connections     Talks on phone: Not on file     Gets together: Not on file     Attends Synagogue service: Not on file     Active member of club or organization: Not on file     Attends meetings of clubs or organizations: Not on file     Relationship status: Not on file    Intimate partner violence     Fear of current or ex partner: Not on file     Emotionally abused: Not on file     Physically abused: Not on file     Forced sexual activity: Not on file   Other Topics Concern    Not on file   Social History Narrative    Not on file       Vitals:    05/03/21 1651   BP: (!) 148/98   Temp: 97.1 °F (36.2 °C)   TempSrc: Temporal   Weight: 234 lb (106.1 kg)       Focused Lower Extremity Physical Exam:  Vitals:    05/03/21 1651   BP: (!) 148/98   Temp: 97.1 °F (36.2 °C)        Foot Exam    General  General Appearance: appears stated age and healthy   Orientation: alert and oriented to person, place, and time       Right Foot/Ankle Inspection and Palpation  Tenderness: calcaneus tenderness, bony tenderness and plantar fascia   Swelling: plantar fascia   Skin Exam: skin intact; Neurovascular  Dorsalis pedis: 3+  Posterior tibial: 3+  Saphenous nerve sensation: normal  Tibial nerve sensation: normal  Superficial peroneal nerve sensation: normal  Deep peroneal nerve sensation: normal  Sural nerve sensation: normal  Achilles reflex: 2+  Babinski reflex: 2+    Muscle Strength  Ankle dorsiflexion: 5  Ankle plantar flexion: 5  Ankle inversion: 5  Ankle eversion: 5  Great toe extension: 5  Great toe flexion: 5    Range of Motion    Normal right ankle ROM    Tests  Calcaneal squeeze: positive              Right Ankle Exam     Range of Motion   The patient has normal right ankle ROM. Muscle Strength   Dorsiflexion:  5/5  Plantar flexion:  5/5            Vascular: pulses  dp  pt  PALAPBLE  Capillary Refill Time:   Hair growth  Skin:    Edema:    Neurologic:      Musculoskeletal/ Orthopedic examination:  pain with palpation the inferior aspect of right heel  NAIL   Web space   Derm:          Assessment and Plan: Today we did discuss if conservative treatment fails in the wintertime when the patient is off work we would do a heel spur resection and plantar fascial release endoscopically potential risks, complications, alternative treatments, and procedure prognosis were explained to the patient. Verbal informed consent was obtained from the patient. Chlora prep preparation was performed over plantar fascia. 1 mL of 0.5% Marcaine plain and cc of Celestone Soluspan right heel injected in standard medial approach plantar fascia. Patient tolerated steroid injection well. Band-Aid applied. The patient was educated on a possible steroid flare and the use of ice with frozen water bottle 10 minutes each night discussed. Continue passive and active range of motion stretches and strengthening bilateral plantar fascia and Achilles tendons.   [facility]

## 2023-05-01 ENCOUNTER — OFFICE VISIT (OUTPATIENT)
Dept: PODIATRY | Age: 58
End: 2023-05-01

## 2023-05-01 VITALS
WEIGHT: 234 LBS | DIASTOLIC BLOOD PRESSURE: 82 MMHG | TEMPERATURE: 98.3 F | BODY MASS INDEX: 31.74 KG/M2 | SYSTOLIC BLOOD PRESSURE: 131 MMHG

## 2023-05-01 DIAGNOSIS — M72.2 PLANTAR FASCIAL FIBROMATOSIS: Primary | ICD-10-CM

## 2023-05-01 DIAGNOSIS — M77.31 CALCANEAL SPUR OF BOTH FEET: ICD-10-CM

## 2023-05-01 DIAGNOSIS — M79.675 PAIN IN LEFT TOE(S): ICD-10-CM

## 2023-05-01 DIAGNOSIS — M77.32 CALCANEAL SPUR OF BOTH FEET: ICD-10-CM

## 2023-05-01 RX ORDER — NABUMETONE 500 MG/1
500 TABLET, FILM COATED ORAL 2 TIMES DAILY
Qty: 60 TABLET | Refills: 0 | Status: SHIPPED | OUTPATIENT
Start: 2023-05-01 | End: 2023-05-31

## 2023-05-01 RX ORDER — BETAMETHASONE SODIUM PHOSPHATE AND BETAMETHASONE ACETATE 3; 3 MG/ML; MG/ML
4 INJECTION, SUSPENSION INTRA-ARTICULAR; INTRALESIONAL; INTRAMUSCULAR; SOFT TISSUE ONCE
Status: COMPLETED | OUTPATIENT
Start: 2023-05-01 | End: 2023-05-02

## 2023-05-01 NOTE — PROGRESS NOTES
23  Rosa Elena Arvizu : 1965 Sex: male  Age: 59 y.o. Patient was referred by Shilo Wright MD    Chief Complaint   Patient presents with    Foot Pain     Pt was last seen in  for right heel pain presents today for left foot pain heel pain no trauma noted        Subjective this patient is seen today for new issue of left foot pain. Over 6 weeks no trauma noted pain in the bottom. Foot Pain     Review of Systems  Const: Denies constitutional symptoms  Musculo: Denies symptoms other than stated above  Skin: Denies symptoms other than stated above       Current Outpatient Medications:     nabumetone (RELAFEN) 500 MG tablet, Take 1 tablet by mouth 2 times daily, Disp: 60 tablet, Rfl: 0    omeprazole (PRILOSEC) 20 MG delayed release capsule, take 1 capsule by mouth once daily, Disp: 90 capsule, Rfl: 0    baclofen (LIORESAL) 10 MG tablet, Take 1 tablet by mouth 4 times daily (Patient taking differently: Take 1 tablet by mouth 2 times daily), Disp: 120 tablet, Rfl: 11    rosuvastatin (CRESTOR) 5 MG tablet, Take 1 tablet by mouth daily, Disp: 90 tablet, Rfl: 1    testosterone cypionate (DEPOTESTOTERONE CYPIONATE) 100 MG/ML injection, Inject 1 mL into the muscle every 14 days for 180 days. , Disp: 2 mL, Rfl: 5    Omega-3 Fatty Acids (FISH OIL) 1200 MG CAPS, Take  by mouth daily. , Disp: , Rfl:     aspirin 81 MG tablet, Take 1 tablet by mouth daily, Disp: , Rfl:     Multiple Vitamins-Minerals (MULTIVITAL PO), Take  by mouth daily. , Disp: , Rfl:   Allergies   Allergen Reactions    Levofloxacin        Past Medical History:   Diagnosis Date    Cervical radiculopathy     Family history of prostate cancer     GERD (gastroesophageal reflux disease)     Hyperlipidemia     Impaired fasting glucose     Testosterone deficiency     TIA (transient ischemic attack)      Past Surgical History:   Procedure Laterality Date    CARDIAC VALVE REPLACEMENT      COLONOSCOPY      REFRACTIVE SURGERY      TONSILLECTOMY

## 2023-05-02 RX ADMIN — BETAMETHASONE SODIUM PHOSPHATE AND BETAMETHASONE ACETATE 4 MG: 3; 3 INJECTION, SUSPENSION INTRA-ARTICULAR; INTRALESIONAL; INTRAMUSCULAR; SOFT TISSUE at 09:49

## 2023-05-19 ENCOUNTER — OFFICE VISIT (OUTPATIENT)
Dept: PODIATRY | Age: 58
End: 2023-05-19

## 2023-05-19 VITALS
BODY MASS INDEX: 31.74 KG/M2 | DIASTOLIC BLOOD PRESSURE: 79 MMHG | SYSTOLIC BLOOD PRESSURE: 133 MMHG | WEIGHT: 234 LBS | TEMPERATURE: 98.2 F

## 2023-05-19 DIAGNOSIS — M77.32 CALCANEAL SPUR OF BOTH FEET: ICD-10-CM

## 2023-05-19 DIAGNOSIS — M72.2 PLANTAR FASCIAL FIBROMATOSIS: Primary | ICD-10-CM

## 2023-05-19 DIAGNOSIS — M79.675 PAIN IN LEFT TOE(S): ICD-10-CM

## 2023-05-19 DIAGNOSIS — M77.31 CALCANEAL SPUR OF BOTH FEET: ICD-10-CM

## 2023-05-19 RX ORDER — BETAMETHASONE SODIUM PHOSPHATE AND BETAMETHASONE ACETATE 3; 3 MG/ML; MG/ML
4 INJECTION, SUSPENSION INTRA-ARTICULAR; INTRALESIONAL; INTRAMUSCULAR; SOFT TISSUE ONCE
Status: COMPLETED | OUTPATIENT
Start: 2023-05-19 | End: 2023-05-19

## 2023-05-19 RX ADMIN — BETAMETHASONE SODIUM PHOSPHATE AND BETAMETHASONE ACETATE 4 MG: 3; 3 INJECTION, SUSPENSION INTRA-ARTICULAR; INTRALESIONAL; INTRAMUSCULAR; SOFT TISSUE at 16:36

## 2023-05-19 NOTE — PROGRESS NOTES
23  Epifnaio Gonzalez : 1965 Sex: male  Age: 59 y.o. Patient was referred by Marlene Lynn MD    Chief Complaint   Patient presents with    Foot Pain     Left Plantar fascitis had one injection  nsaid       Subjective this patient is seen today for  issue of left foot pain. Over 9 weeks no trauma noted pain in the bottom. Foot Pain   This is a recurrent problem. The current episode started 1 to 4 weeks ago. There has been no history of extremity trauma. The problem occurs 2 to 4 times per day. The problem has been gradually improving. Review of Systems  Const: Denies constitutional symptoms  Musculo: Denies symptoms other than stated above  Skin: Denies symptoms other than stated above       Current Outpatient Medications:     nabumetone (RELAFEN) 500 MG tablet, Take 1 tablet by mouth 2 times daily, Disp: 60 tablet, Rfl: 0    omeprazole (PRILOSEC) 20 MG delayed release capsule, take 1 capsule by mouth once daily, Disp: 90 capsule, Rfl: 0    baclofen (LIORESAL) 10 MG tablet, Take 1 tablet by mouth 4 times daily (Patient taking differently: Take 1 tablet by mouth 2 times daily), Disp: 120 tablet, Rfl: 11    rosuvastatin (CRESTOR) 5 MG tablet, Take 1 tablet by mouth daily, Disp: 90 tablet, Rfl: 1    Omega-3 Fatty Acids (FISH OIL) 1200 MG CAPS, Take  by mouth daily. , Disp: , Rfl:     aspirin 81 MG tablet, Take 1 tablet by mouth daily, Disp: , Rfl:     Multiple Vitamins-Minerals (MULTIVITAL PO), Take  by mouth daily. , Disp: , Rfl:     testosterone cypionate (DEPOTESTOTERONE CYPIONATE) 100 MG/ML injection, Inject 1 mL into the muscle every 14 days for 180 days. , Disp: 2 mL, Rfl: 5  Allergies   Allergen Reactions    Levofloxacin        Past Medical History:   Diagnosis Date    Cervical radiculopathy     Family history of prostate cancer     GERD (gastroesophageal reflux disease)     Hyperlipidemia     Impaired fasting glucose     Testosterone deficiency     TIA (transient ischemic attack)

## 2023-05-31 ENCOUNTER — TELEPHONE (OUTPATIENT)
Dept: PODIATRY | Age: 58
End: 2023-05-31

## 2023-05-31 DIAGNOSIS — M72.2 PLANTAR FASCIAL FIBROMATOSIS: ICD-10-CM

## 2023-05-31 RX ORDER — NABUMETONE 500 MG/1
500 TABLET, FILM COATED ORAL 2 TIMES DAILY
Qty: 60 TABLET | Refills: 0 | Status: SHIPPED | OUTPATIENT
Start: 2023-05-31 | End: 2023-06-30

## 2023-06-19 ENCOUNTER — OFFICE VISIT (OUTPATIENT)
Dept: PODIATRY | Age: 58
End: 2023-06-19
Payer: COMMERCIAL

## 2023-06-19 VITALS
TEMPERATURE: 98.2 F | WEIGHT: 234 LBS | DIASTOLIC BLOOD PRESSURE: 72 MMHG | BODY MASS INDEX: 31.74 KG/M2 | SYSTOLIC BLOOD PRESSURE: 130 MMHG

## 2023-06-19 DIAGNOSIS — M72.2 PLANTAR FASCIAL FIBROMATOSIS: Primary | ICD-10-CM

## 2023-06-19 DIAGNOSIS — M77.31 CALCANEAL SPUR OF BOTH FEET: ICD-10-CM

## 2023-06-19 DIAGNOSIS — M77.32 CALCANEAL SPUR OF BOTH FEET: ICD-10-CM

## 2023-06-19 DIAGNOSIS — M79.675 PAIN IN LEFT TOE(S): ICD-10-CM

## 2023-06-19 PROCEDURE — 99212 OFFICE O/P EST SF 10 MIN: CPT | Performed by: PODIATRIST

## 2023-06-19 NOTE — PROGRESS NOTES
23  Louie Ruiz : 1965 Sex: male  Age: 59 y.o. Patient was referred by Teofilo Fothergill, MD    Chief Complaint   Patient presents with    Foot Pain     Left heel pain had two injections  Taking NSAIDS  Not icing        Subjective this patient is seen today for  issue of left foot pain. Injection has helped     Foot Pain   This is a recurrent problem. The current episode started 1 to 4 weeks ago. There has been no history of extremity trauma. The problem occurs 2 to 4 times per day. The problem has been gradually improving. Review of Systems  Const: Denies constitutional symptoms  Musculo: Denies symptoms other than stated above  Skin: Denies symptoms other than stated above       Current Outpatient Medications:     nabumetone (RELAFEN) 500 MG tablet, Take 1 tablet by mouth 2 times daily, Disp: 60 tablet, Rfl: 0    omeprazole (PRILOSEC) 20 MG delayed release capsule, take 1 capsule by mouth once daily, Disp: 90 capsule, Rfl: 0    baclofen (LIORESAL) 10 MG tablet, Take 1 tablet by mouth 4 times daily (Patient taking differently: Take 1 tablet by mouth 2 times daily), Disp: 120 tablet, Rfl: 11    rosuvastatin (CRESTOR) 5 MG tablet, Take 1 tablet by mouth daily, Disp: 90 tablet, Rfl: 1    Omega-3 Fatty Acids (FISH OIL) 1200 MG CAPS, Take  by mouth daily. , Disp: , Rfl:     aspirin 81 MG tablet, Take 1 tablet by mouth daily, Disp: , Rfl:     Multiple Vitamins-Minerals (MULTIVITAL PO), Take  by mouth daily. , Disp: , Rfl:     testosterone cypionate (DEPOTESTOTERONE CYPIONATE) 100 MG/ML injection, Inject 1 mL into the muscle every 14 days for 180 days. , Disp: 2 mL, Rfl: 5  Allergies   Allergen Reactions    Levofloxacin        Past Medical History:   Diagnosis Date    Cervical radiculopathy     Family history of prostate cancer     GERD (gastroesophageal reflux disease)     Hyperlipidemia     Impaired fasting glucose     Testosterone deficiency     TIA (transient ischemic attack)      Past Surgical

## 2023-07-03 ENCOUNTER — TELEPHONE (OUTPATIENT)
Dept: ADMINISTRATIVE | Age: 58
End: 2023-07-03

## 2023-07-03 DIAGNOSIS — M72.2 PLANTAR FASCIAL FIBROMATOSIS: ICD-10-CM

## 2023-07-03 RX ORDER — NABUMETONE 500 MG/1
500 TABLET, FILM COATED ORAL 2 TIMES DAILY
Qty: 60 TABLET | Refills: 0 | Status: SHIPPED | OUTPATIENT
Start: 2023-07-03 | End: 2023-08-02

## 2023-07-03 NOTE — TELEPHONE ENCOUNTER
Pt called and requested a refill on his anti-inflammatory medication. He was unsure of the name. Please contact pt if any questions.

## 2023-07-31 ENCOUNTER — OFFICE VISIT (OUTPATIENT)
Dept: PODIATRY | Age: 58
End: 2023-07-31
Payer: COMMERCIAL

## 2023-07-31 VITALS — BODY MASS INDEX: 31.74 KG/M2 | WEIGHT: 234 LBS | TEMPERATURE: 97.7 F

## 2023-07-31 DIAGNOSIS — M72.2 PLANTAR FASCIAL FIBROMATOSIS: ICD-10-CM

## 2023-07-31 PROCEDURE — 99213 OFFICE O/P EST LOW 20 MIN: CPT | Performed by: PODIATRIST

## 2023-07-31 RX ORDER — NABUMETONE 500 MG/1
500 TABLET, FILM COATED ORAL 2 TIMES DAILY
Qty: 180 TABLET | Refills: 3 | Status: SHIPPED | OUTPATIENT
Start: 2023-07-31 | End: 2023-10-29

## 2023-07-31 RX ORDER — NABUMETONE 500 MG/1
500 TABLET, FILM COATED ORAL 2 TIMES DAILY
Qty: 60 TABLET | Refills: 0 | Status: SHIPPED
Start: 2023-07-31 | End: 2023-07-31 | Stop reason: SDUPTHER

## 2023-08-17 ENCOUNTER — OFFICE VISIT (OUTPATIENT)
Age: 58
End: 2023-08-17
Payer: COMMERCIAL

## 2023-08-17 VITALS
DIASTOLIC BLOOD PRESSURE: 70 MMHG | HEIGHT: 72 IN | BODY MASS INDEX: 31.69 KG/M2 | HEART RATE: 78 BPM | TEMPERATURE: 97.9 F | WEIGHT: 234 LBS | OXYGEN SATURATION: 98 % | RESPIRATION RATE: 18 BRPM | SYSTOLIC BLOOD PRESSURE: 130 MMHG
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
## 2023-08-17 DIAGNOSIS — L03.115 CELLULITIS OF RIGHT LOWER EXTREMITY: ICD-10-CM

## 2023-08-17 DIAGNOSIS — S90.561A INSECT BITE OF RIGHT ANKLE, INITIAL ENCOUNTER: Primary | ICD-10-CM

## 2023-08-17 PROCEDURE — 99213 OFFICE O/P EST LOW 20 MIN: CPT | Performed by: NURSE PRACTITIONER

## 2023-08-17 RX ORDER — DOXYCYCLINE HYCLATE 100 MG
100 TABLET ORAL 2 TIMES DAILY
Qty: 20 TABLET | Refills: 0 | Status: SHIPPED | OUTPATIENT
Start: 2023-08-17 | End: 2023-08-27

## 2023-08-17 SDOH — ECONOMIC STABILITY: FOOD INSECURITY: WITHIN THE PAST 12 MONTHS, YOU WORRIED THAT YOUR FOOD WOULD RUN OUT BEFORE YOU GOT MONEY TO BUY MORE.: NEVER TRUE

## 2023-08-17 SDOH — ECONOMIC STABILITY: INCOME INSECURITY: HOW HARD IS IT FOR YOU TO PAY FOR THE VERY BASICS LIKE FOOD, HOUSING, MEDICAL CARE, AND HEATING?: NOT HARD AT ALL

## 2023-08-17 SDOH — ECONOMIC STABILITY: FOOD INSECURITY: WITHIN THE PAST 12 MONTHS, THE FOOD YOU BOUGHT JUST DIDN'T LAST AND YOU DIDN'T HAVE MONEY TO GET MORE.: NEVER TRUE

## 2023-08-17 SDOH — ECONOMIC STABILITY: HOUSING INSECURITY
IN THE LAST 12 MONTHS, WAS THERE A TIME WHEN YOU DID NOT HAVE A STEADY PLACE TO SLEEP OR SLEPT IN A SHELTER (INCLUDING NOW)?: NO

## 2023-08-17 ASSESSMENT — ENCOUNTER SYMPTOMS
PHOTOPHOBIA: 0
BLOOD IN STOOL: 0
VOMITING: 0
VOICE CHANGE: 0
SINUS PRESSURE: 0
DIARRHEA: 0
COLOR CHANGE: 1
RECTAL PAIN: 0
BACK PAIN: 0
RHINORRHEA: 0
NAUSEA: 0
ABDOMINAL PAIN: 0
SINUS PAIN: 0
STRIDOR: 0
CHEST TIGHTNESS: 0
ANAL BLEEDING: 0
CHOKING: 0
EYE ITCHING: 0
FACIAL SWELLING: 0
WHEEZING: 0
SORE THROAT: 0
TROUBLE SWALLOWING: 0
EYE PAIN: 0
EYE REDNESS: 0
CONSTIPATION: 0
EYE DISCHARGE: 0
APNEA: 0
ABDOMINAL DISTENTION: 0
COUGH: 0
SHORTNESS OF BREATH: 0

## 2023-08-17 NOTE — PROGRESS NOTES
23  Vane Mohs : 1965 Sex: male  Age: 59 y.o. Chief Complaint   Patient presents with    Insect Bite     Patient has 2 insect bite on his right ankle that he got on Monday . They are no looking infected burn itch and hurt. are purple color on outside with white middle       Patient complains of bug bites to right ankle area that happened earlier in the week around Monday. It started out as red and then got a purpleish color with some white in the center. It is getting more swollen and more tender as the week has gone on. He has had no fever, chills, sweats. There is been no evidence of bull's-eye rash. He is not sure what type of insect bit him. Review of Systems   Constitutional:  Negative for activity change, appetite change, chills, diaphoresis, fatigue, fever and unexpected weight change. HENT:  Negative for congestion, dental problem, drooling, ear discharge, ear pain, facial swelling, hearing loss, mouth sores, nosebleeds, postnasal drip, rhinorrhea, sinus pressure, sinus pain, sneezing, sore throat, tinnitus, trouble swallowing and voice change. Eyes:  Negative for photophobia, pain, discharge, redness, itching and visual disturbance. Respiratory:  Negative for apnea, cough, choking, chest tightness, shortness of breath, wheezing and stridor. Cardiovascular:  Negative for chest pain, palpitations and leg swelling. Gastrointestinal:  Negative for abdominal distention, abdominal pain, anal bleeding, blood in stool, constipation, diarrhea, nausea, rectal pain and vomiting. Endocrine: Negative for cold intolerance, heat intolerance, polydipsia, polyphagia and polyuria. Genitourinary:  Negative for decreased urine volume, difficulty urinating, dysuria, enuresis, flank pain, frequency, genital sores, hematuria and urgency. Musculoskeletal:  Negative for arthralgias, back pain, gait problem, joint swelling, myalgias, neck pain and neck stiffness.    Skin:  Positive for

## 2023-08-18 ENCOUNTER — APPOINTMENT (OUTPATIENT)
Dept: GENERAL RADIOLOGY | Age: 58
End: 2023-08-18
Payer: COMMERCIAL

## 2023-08-18 ENCOUNTER — HOSPITAL ENCOUNTER (EMERGENCY)
Age: 58
Discharge: HOME | End: 2023-08-18
Payer: COMMERCIAL

## 2023-08-18 VITALS
BODY MASS INDEX: 32.51 KG/M2 | SYSTOLIC BLOOD PRESSURE: 158 MMHG | OXYGEN SATURATION: 98 % | HEART RATE: 68 BPM | DIASTOLIC BLOOD PRESSURE: 89 MMHG | HEIGHT: 72 IN | RESPIRATION RATE: 18 BRPM | TEMPERATURE: 97.7 F | WEIGHT: 240 LBS

## 2023-08-18 DIAGNOSIS — S90.561A INFECTED INSECT BITE OF RIGHT ANKLE, INITIAL ENCOUNTER: Primary | ICD-10-CM
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
## 2023-08-18 DIAGNOSIS — L03.115 CELLULITIS OF RIGHT LOWER EXTREMITY: ICD-10-CM

## 2023-08-18 DIAGNOSIS — Z23 NEED FOR TETANUS BOOSTER: ICD-10-CM

## 2023-08-18 DIAGNOSIS — L08.9 INFECTED INSECT BITE OF RIGHT ANKLE, INITIAL ENCOUNTER: Primary | ICD-10-CM

## 2023-08-18 PROCEDURE — 90471 IMMUNIZATION ADMIN: CPT | Performed by: NURSE PRACTITIONER

## 2023-08-18 PROCEDURE — 73610 X-RAY EXAM OF ANKLE: CPT

## 2023-08-18 PROCEDURE — 87186 SC STD MICRODIL/AGAR DIL: CPT

## 2023-08-18 PROCEDURE — 90714 TD VACC NO PRESV 7 YRS+ IM: CPT | Performed by: NURSE PRACTITIONER

## 2023-08-18 PROCEDURE — 6370000000 HC RX 637 (ALT 250 FOR IP): Performed by: NURSE PRACTITIONER

## 2023-08-18 PROCEDURE — 87205 SMEAR GRAM STAIN: CPT

## 2023-08-18 PROCEDURE — 87040 BLOOD CULTURE FOR BACTERIA: CPT

## 2023-08-18 PROCEDURE — 6360000002 HC RX W HCPCS: Performed by: NURSE PRACTITIONER

## 2023-08-18 PROCEDURE — 87077 CULTURE AEROBIC IDENTIFY: CPT

## 2023-08-18 PROCEDURE — 87070 CULTURE OTHR SPECIMN AEROBIC: CPT

## 2023-08-18 PROCEDURE — 99284 EMERGENCY DEPT VISIT MOD MDM: CPT

## 2023-08-18 RX ORDER — DOXYCYCLINE HYCLATE 100 MG/1
100 CAPSULE ORAL ONCE
Status: COMPLETED | OUTPATIENT
Start: 2023-08-18 | End: 2023-08-18

## 2023-08-18 RX ORDER — CEPHALEXIN 500 MG/1
500 CAPSULE ORAL 4 TIMES DAILY
Qty: 39 CAPSULE | Refills: 0 | Status: SHIPPED | OUTPATIENT
Start: 2023-08-18 | End: 2023-08-21 | Stop reason: ALTCHOICE

## 2023-08-18 RX ORDER — CEPHALEXIN 500 MG/1
500 CAPSULE ORAL ONCE
Status: COMPLETED | OUTPATIENT
Start: 2023-08-18 | End: 2023-08-18

## 2023-08-18 RX ADMIN — CEPHALEXIN 500 MG: 500 CAPSULE ORAL at 23:06

## 2023-08-18 RX ADMIN — CLOSTRIDIUM TETANI TOXOID ANTIGEN (FORMALDEHYDE INACTIVATED) AND CORYNEBACTERIUM DIPHTHERIAE TOXOID ANTIGEN (FORMALDEHYDE INACTIVATED) 0.5 ML: 5; 2 INJECTION, SUSPENSION INTRAMUSCULAR at 22:16

## 2023-08-18 RX ADMIN — DOXYCYCLINE 100 MG: 100 CAPSULE ORAL at 23:06

## 2023-08-18 ASSESSMENT — PAIN DESCRIPTION - ORIENTATION: ORIENTATION: RIGHT

## 2023-08-18 ASSESSMENT — PAIN DESCRIPTION - LOCATION: LOCATION: ANKLE

## 2023-08-18 ASSESSMENT — PAIN DESCRIPTION - DESCRIPTORS: DESCRIPTORS: SHARP;SHOOTING

## 2023-08-18 ASSESSMENT — LIFESTYLE VARIABLES
HOW OFTEN DO YOU HAVE A DRINK CONTAINING ALCOHOL: NEVER
HOW MANY STANDARD DRINKS CONTAINING ALCOHOL DO YOU HAVE ON A TYPICAL DAY: PATIENT DOES NOT DRINK

## 2023-08-18 ASSESSMENT — PAIN SCALES - GENERAL: PAINLEVEL_OUTOF10: 2

## 2023-08-19 NOTE — ED PROVIDER NOTES
Independent SANDER Visit. [facility]  Department of Emergency Medicine   ED  Encounter Note  Admit Date/RoomTime: 2023  9:54 PM  ED Room: [facility] [address]    NAME: Ann Marie Almonte  : 1965  MRN: [id number]     Chief Complaint:  Insect Bite (Right ankle)    History of Present Illness       Ann Marie Almonte is a 59 y.o. old male who presents to the emergency department by private vehicle, for an unknown insect bite to right lateral ankle, which occured 5 day(s) prior to arrival.  The complaint is associated with localized erythema and swelling and drainage. Since onset the symptoms have been remaining constant. He denies rash, hives, welts, difficulty breathing, difficulty swallowing, wheezing, throat tightness, hoarseness, stridor, itching, lightheadedness, dizziness, facial swelling, lip swelling, tongue swelling, fever, chills, chest pain, or abd pain. The patient has a history of no similar reactions. Patient was seen at [facility] walk-in clinic yesterday and was started on doxycycline twice daily for 10 days. He states he is taken 2 doses so. He states it started to drain and increase swelling and came for further evaluation. Tetanus Status:  more than 5 years ago. Additional Symptoms:      Drainage:   yes. Abrasion:   no.     Pustule:   yes. Puncture:   yes. ROS   Pertinent positives and negatives are stated within HPI, all other systems reviewed and are negative. Past Medical History:  has a past medical history of Cervical radiculopathy, Family history of prostate cancer, GERD (gastroesophageal reflux disease), Hyperlipidemia, Impaired fasting glucose, Testosterone deficiency, and TIA (transient ischemic attack). Surgical History:  has a past surgical history that includes Vasectomy; Refractive surgery; Tonsillectomy; Colonoscopy; and Cardiac valve replacement. Social History:  reports that he has quit smoking.  He has never used smokeless tobacco. He 2306)   cephALEXin (KEFLEX) capsule 500 mg (500 mg Oral Given 8/18/23 2306)        Consults:   None    Procedure(s):   none    Medical Decision Making    Patient presents to the ER for insect bite Monday. Went to urgent care yesterday was started on doxycycline however only took 2 doses. Denies any shortness breath, chest pain, lightness or dizziness. Denies any fevers or chills. Denies any numbness or weakness. Social Determinants include   Social Connections: Not on file    Social Determinants : None. Chronic conditions    Past Medical History:   Diagnosis Date    Cervical radiculopathy     Family history of prostate cancer     GERD (gastroesophageal reflux disease)     Hyperlipidemia     Impaired fasting glucose     Testosterone deficiency     TIA (transient ischemic attack) 2011   . Physical exam patient has 2 puncture wounds that are actively draining purulent bloody drainage. No fluctuance noted. Moderate erythema and induration surrounding bite. Compartments are soft and compressible. DP and PT pulses palpable. .  Vital signs stable. Differential diagnoses include but not limited to cellulitis versus abscess. Diagnostic studies including labs and imagining which was interpreted by radiologist reveals x-ray of the right ankle reveals no acute abnormalities. No foreign bodies noted. Medial ankle soft tissue swelling noted wound cultures pending at this time. Consults included none. Results were discussed with patient and son. Patient was given doxycycline since he did not take his evening dose today as well as first dose of Keflex. Tetanus shot was updated today. . Patient will be discharged home with the following prescriptions, Keflex. Discussed appropriate use and potential side effects of starting the prescribed medications. Patient continues to be non-toxic on re-evaluation. Findings were discussed with the patient and reasons to immediately return to the ED were articulated to them.

## 2023-08-20 LAB
MICROORGANISM SPEC CULT: NO GROWTH
MICROORGANISM/AGENT SPEC: NORMAL
SPECIMEN DESCRIPTION: NORMAL

## 2023-08-21 ENCOUNTER — HOSPITAL ENCOUNTER (EMERGENCY)
Age: 58
Discharge: HOME | DRG: 596 | End: 2023-08-21
Payer: COMMERCIAL

## 2023-08-21 ENCOUNTER — APPOINTMENT (OUTPATIENT)
Dept: ULTRASOUND IMAGING | Age: 58
DRG: 596 | End: 2023-08-21
Payer: COMMERCIAL

## 2023-08-21 VITALS
HEIGHT: 72 IN | WEIGHT: 240 LBS | BODY MASS INDEX: 32.51 KG/M2 | SYSTOLIC BLOOD PRESSURE: 150 MMHG | DIASTOLIC BLOOD PRESSURE: 83 MMHG | TEMPERATURE: 98.4 F | HEART RATE: 72 BPM | OXYGEN SATURATION: 97 % | RESPIRATION RATE: 16 BRPM

## 2023-08-21 DIAGNOSIS — L03.115 CELLULITIS OF RIGHT LOWER EXTREMITY: Primary | ICD-10-CM

## 2023-08-21 LAB
ANION GAP SERPL CALCULATED.3IONS-SCNC: 12 MMOL/L (ref 7–16)
BASOPHILS # BLD: 0.04 K/UL (ref 0–0.2)
BASOPHILS NFR BLD: 1 % (ref 0–2)
BUN SERPL-MCNC: 16 MG/DL (ref 6–20)
CALCIUM SERPL-MCNC: 9.9 MG/DL (ref 8.6–10.2)
CHLORIDE SERPL-SCNC: 100 MMOL/L (ref 98–107)
CO2 SERPL-SCNC: 26 MMOL/L (ref 22–29)
CREAT SERPL-MCNC: 1 MG/DL (ref 0.7–1.2)
CRP SERPL HS-MCNC: 7 MG/L (ref 0–5)
EOSINOPHIL # BLD: 0.12 K/UL (ref 0.05–0.5)
EOSINOPHILS RELATIVE PERCENT: 2 % (ref 0–6)
ERYTHROCYTE [DISTWIDTH] IN BLOOD BY AUTOMATED COUNT: 13.6 % (ref 11.5–15)
ERYTHROCYTE [SEDIMENTATION RATE] IN BLOOD BY WESTERGREN METHOD: 1 MM/HR (ref 0–15)
GFR SERPL CREATININE-BSD FRML MDRD: >60 ML/MIN/1.73M2
GLUCOSE SERPL-MCNC: 135 MG/DL (ref 74–99)
HCT VFR BLD AUTO: 49.6 % (ref 37–54)
HGB BLD-MCNC: 17.2 G/DL (ref 12.5–16.5)
IMM GRANULOCYTES # BLD AUTO: <0.03 K/UL (ref 0–0.58)
IMM GRANULOCYTES NFR BLD: 0 % (ref 0–5)
LYMPHOCYTES NFR BLD: 1.32 K/UL (ref 1.5–4)
LYMPHOCYTES RELATIVE PERCENT: 17 % (ref 20–42)
MCH RBC QN AUTO: 31.8 PG (ref 26–35)
MCHC RBC AUTO-ENTMCNC: 34.7 G/DL (ref 32–34.5)
MCV RBC AUTO: 91.7 FL (ref 80–99.9)
MONOCYTES NFR BLD: 0.63 K/UL (ref 0.1–0.95)
MONOCYTES NFR BLD: 8 % (ref 2–12)
NEUTROPHILS NFR BLD: 73 % (ref 43–80)
NEUTS SEG NFR BLD: 5.66 K/UL (ref 1.8–7.3)
PLATELET # BLD AUTO: 228 K/UL (ref 130–450)
PMV BLD AUTO: 9.6 FL (ref 7–12)
POTASSIUM SERPL-SCNC: 3.9 MMOL/L (ref 3.5–5)
RBC # BLD AUTO: 5.41 M/UL (ref 3.8–5.8)
SODIUM SERPL-SCNC: 138 MMOL/L (ref 132–146)
WBC OTHER # BLD: 7.8 K/UL (ref 4.5–11.5)

## 2023-08-21 PROCEDURE — 76999 ECHO EXAMINATION PROCEDURE: CPT

## 2023-08-21 PROCEDURE — 80048 BASIC METABOLIC PNL TOTAL CA: CPT

## 2023-08-21 PROCEDURE — 87070 CULTURE OTHR SPECIMN AEROBIC: CPT

## 2023-08-21 PROCEDURE — 87040 BLOOD CULTURE FOR BACTERIA: CPT

## 2023-08-21 PROCEDURE — 87205 SMEAR GRAM STAIN: CPT

## 2023-08-21 PROCEDURE — 99284 EMERGENCY DEPT VISIT MOD MDM: CPT

## 2023-08-21 PROCEDURE — 93971 EXTREMITY STUDY: CPT

## 2023-08-21 PROCEDURE — 85025 COMPLETE CBC W/AUTO DIFF WBC: CPT

## 2023-08-21 PROCEDURE — 85652 RBC SED RATE AUTOMATED: CPT

## 2023-08-21 PROCEDURE — 86140 C-REACTIVE PROTEIN: CPT

## 2023-08-21 PROCEDURE — 6370000000 HC RX 637 (ALT 250 FOR IP): Performed by: PHYSICIAN ASSISTANT

## 2023-08-21 PROCEDURE — 87077 CULTURE AEROBIC IDENTIFY: CPT

## 2023-08-21 RX ORDER — AMOXICILLIN AND CLAVULANATE POTASSIUM 875; 125 MG/1; MG/1
1 TABLET, FILM COATED ORAL 2 TIMES DAILY
Qty: 20 TABLET | Refills: 0 | Status: ON HOLD | OUTPATIENT
Start: 2023-08-21 | End: 2023-08-28 | Stop reason: HOSPADM

## 2023-08-21 RX ORDER — AMOXICILLIN AND CLAVULANATE POTASSIUM 875; 125 MG/1; MG/1
1 TABLET, FILM COATED ORAL ONCE
Status: COMPLETED | OUTPATIENT
Start: 2023-08-21 | End: 2023-08-21

## 2023-08-21 RX ADMIN — AMOXICILLIN AND CLAVULANATE POTASSIUM 1 TABLET: 875; 125 TABLET, FILM COATED ORAL at 20:51

## 2023-08-21 ASSESSMENT — PAIN - FUNCTIONAL ASSESSMENT: PAIN_FUNCTIONAL_ASSESSMENT: 0-10

## 2023-08-21 ASSESSMENT — PAIN SCALES - GENERAL: PAINLEVEL_OUTOF10: 3

## 2023-08-22 NOTE — ED PROVIDER NOTES
Culture, Blood 2    Specimen: Blood   Result Value Ref Range    Specimen Description . BLOOD     Special Requests          Culture NO GROWTH <24 HRS    CBC with Auto Differential   Result Value Ref Range    WBC 7.8 4.5 - 11.5 k/uL    RBC 5.41 3.80 - 5.80 m/uL    Hemoglobin 17.2 (H) 12.5 - 16.5 g/dL    Hematocrit 49.6 37.0 - 54.0 %    MCV 91.7 80.0 - 99.9 fL    MCH 31.8 26.0 - 35.0 pg    MCHC 34.7 (H) 32.0 - 34.5 g/dL    RDW 13.6 11.5 - 15.0 %    Platelets 343 170 - 253 k/uL    MPV 9.6 7.0 - 12.0 fL    Neutrophils % 73 43.0 - 80.0 %    Lymphocytes % 17 (L) 20.0 - 42.0 %    Monocytes % 8 2.0 - 12.0 %    Eosinophils % 2 0 - 6 %    Basophils % 1 0.0 - 2.0 %    Immature Granulocytes 0 0.0 - 5.0 %    Neutrophils Absolute 5.66 1.80 - 7.30 k/uL    Lymphocytes Absolute 1.32 (L) 1.50 - 4.00 k/uL    Monocytes Absolute 0.63 0.10 - 0.95 k/uL    Eosinophils Absolute 0.12 0.05 - 0.50 k/uL    Basophils Absolute 0.04 0.00 - 0.20 k/uL    Absolute Immature Granulocyte <0.03 0.00 - 0.58 k/uL   Basic Metabolic Panel   Result Value Ref Range    Glucose 135 (H) 74 - 99 mg/dL    BUN 16 6 - 20 mg/dL    Creatinine 1.0 0.70 - 1.20 mg/dL    Est, Glom Filt Rate >60 >60 mL/min/1.73m2    Calcium 9.9 8.6 - 10.2 mg/dL    Sodium 138 132 - 146 mmol/L    Potassium 3.9 3.5 - 5.0 mmol/L    Chloride 100 98 - 107 mmol/L    CO2 26 22 - 29 mmol/L    Anion Gap 12 7 - 16 mmol/L   Sedimentation Rate   Result Value Ref Range    Sed Rate 1 0 - 15 mm/Hr   C-Reactive Protein   Result Value Ref Range    CRP 7.0 (H) 0 - 5 mg/L       Imaging: All Radiology results interpreted by Radiologist unless otherwise noted. US DUP LOWER EXTREMITY RIGHT ABDULLAHI   Final Result   No evidence of DVT in the right lower extremity. US SOFT TISSUE LIMITED AREA   Final Result   4 mm probable focus of free fluid in the region of the right lateral ankle. No evidence for abscess.              ED Course / Medical Decision Making     Medications   amoxicillin-clavulanate (AUGMENTIN) 875-125 MG per tablet 1 tablet (1 tablet Oral Given 8/21/23 2051)        Consults:   None    Procedures:   none    MDM:   DDX: Cellulitis, abscess, DVT, Lyme disease    Pt presents for gradual onset, worsening red, blistering, and weeping area on right lateral lower leg which began 6 day(s) prior to arrival.  The symptoms were caused by he believes insect bite. He was seen at urgent care the next day and they started him on doxycycline. He came into emergency the day after that and was started on Keflex. He reports he is developing swelling in the leg and has 2 other spots of erythema on the shin. Since onset the symptoms have been gradually worsening. He has had no prior episodes. His symptoms are associated with localized erythema and swelling and relieved by nothing. He denies any additional symptoms, welts, difficulty breathing, difficulty swallowing, wheezing, throat tightness, hoarseness, fever, chills, or abd pain. PT leg is wrapped very tightly with coban gauze causing constriction. Culture done in ER 3 days ago was positive for diphtheroides. Patient's physical exam fairly unremarkable, there open wound which is seeping a clearish fluid on the right lateral ankle about 2 cm above the lateral malleolus and another 1 which is closed and very small just posterior to that surrounded by area of this erythema. There is trace nonpitting peripheral edema of the lower leg. I believe this is partially due to the fact that patient had the wounds on the ankle wrapped extremely tightly with Coban. Ultrasound negative for DVT. There is no abscess in the. White blood cell count normal, sed rate is 1. Patient is advised to continue the doxycycline Keflex is going to be changed to Augmentin. Was given soft gauze wraps and to use instead of the Coban. Advised to keep covered when possible. Continue warm compresses and washing with hot soapy water normally would when showering or bathing. Return to emergency with development of fevers, shaking chills, advancing erythema. Additional wound cultures were taken, blood cultures were drawn. CRP is resulted at 7, was not resulted at the time of patient discharge. PT provided history,. Plan of Care/Counseling:  Iván Phillips PA-C reviewed today's visit with the patient and spouse / life partner in addition to providing specific details for the plan of care and counseling regarding the diagnosis and prognosis. Questions are answered at this time and are agreeable with the plan. Assessment      1. Cellulitis of right lower extremity      Plan   Discharged home. Patient condition is stable    New Medications     Discharge Medication List as of 8/21/2023  9:01 PM        START taking these medications    Details   amoxicillin-clavulanate (AUGMENTIN) 875-125 MG per tablet Take 1 tablet by mouth 2 times daily for 10 days, Disp-20 tablet, R-0Normal           Electronically signed by Iván Phillips PA-C   DD: 8/22/23  **This report was transcribed using voice recognition software. Every effort was made to ensure accuracy; however, inadvertent computerized transcription errors may be present.   END OF ED PROVIDER NOTE       Iván Phillips PA-C  08/22/23 [id number]

## 2023-08-24 ENCOUNTER — HOSPITAL ENCOUNTER (INPATIENT)
Age: 58
LOS: 4 days | Discharge: HOME | DRG: 596 | End: 2023-08-28
Attending: STUDENT IN AN ORGANIZED HEALTH CARE EDUCATION/TRAINING PROGRAM | Admitting: INTERNAL MEDICINE
Payer: COMMERCIAL

## 2023-08-24 DIAGNOSIS — L03.115 CELLULITIS OF RIGHT LOWER EXTREMITY: Primary | ICD-10-CM

## 2023-08-24 PROBLEM — L03.90 CELLULITIS: Status: ACTIVE | Noted: 2023-08-24

## 2023-08-24 LAB
ALBUMIN SERPL-MCNC: 4.8 G/DL (ref 3.5–5.2)
ALP SERPL-CCNC: 71 U/L (ref 40–129)
ALT SERPL-CCNC: 25 U/L (ref 0–40)
ANION GAP SERPL CALCULATED.3IONS-SCNC: 13 MMOL/L (ref 7–16)
AST SERPL-CCNC: 24 U/L (ref 0–39)
BASOPHILS # BLD: 0.07 K/UL (ref 0–0.2)
BASOPHILS NFR BLD: 1 % (ref 0–2)
BILIRUB SERPL-MCNC: 0.7 MG/DL (ref 0–1.2)
BUN SERPL-MCNC: 15 MG/DL (ref 6–20)
CALCIUM SERPL-MCNC: 9.7 MG/DL (ref 8.6–10.2)
CHLORIDE SERPL-SCNC: 103 MMOL/L (ref 98–107)
CO2 SERPL-SCNC: 26 MMOL/L (ref 22–29)
CREAT SERPL-MCNC: 0.9 MG/DL (ref 0.7–1.2)
EOSINOPHIL # BLD: 0.16 K/UL (ref 0.05–0.5)
EOSINOPHILS RELATIVE PERCENT: 2 % (ref 0–6)
ERYTHROCYTE [DISTWIDTH] IN BLOOD BY AUTOMATED COUNT: 13.6 % (ref 11.5–15)
GFR SERPL CREATININE-BSD FRML MDRD: >60 ML/MIN/1.73M2
GLUCOSE SERPL-MCNC: 93 MG/DL (ref 74–99)
HCT VFR BLD AUTO: 52.2 % (ref 37–54)
HGB BLD-MCNC: 17.7 G/DL (ref 12.5–16.5)
IMM GRANULOCYTES # BLD AUTO: 0.03 K/UL (ref 0–0.58)
IMM GRANULOCYTES NFR BLD: 0 % (ref 0–5)
LACTATE BLDV-SCNC: 1 MMOL/L (ref 0.5–2.2)
LYMPHOCYTES NFR BLD: 1.93 K/UL (ref 1.5–4)
LYMPHOCYTES RELATIVE PERCENT: 21 % (ref 20–42)
MAGNESIUM SERPL-MCNC: 2.3 MG/DL (ref 1.6–2.6)
MCH RBC QN AUTO: 31.1 PG (ref 26–35)
MCHC RBC AUTO-ENTMCNC: 33.9 G/DL (ref 32–34.5)
MCV RBC AUTO: 91.7 FL (ref 80–99.9)
MONOCYTES NFR BLD: 0.75 K/UL (ref 0.1–0.95)
MONOCYTES NFR BLD: 8 % (ref 2–12)
NEUTROPHILS NFR BLD: 69 % (ref 43–80)
NEUTS SEG NFR BLD: 6.39 K/UL (ref 1.8–7.3)
PLATELET # BLD AUTO: 262 K/UL (ref 130–450)
PMV BLD AUTO: 9.6 FL (ref 7–12)
POTASSIUM SERPL-SCNC: 4.2 MMOL/L (ref 3.5–5)
PROT SERPL-MCNC: 7.6 G/DL (ref 6.4–8.3)
RBC # BLD AUTO: 5.69 M/UL (ref 3.8–5.8)
SODIUM SERPL-SCNC: 142 MMOL/L (ref 132–146)
WBC OTHER # BLD: 9.3 K/UL (ref 4.5–11.5)

## 2023-08-24 PROCEDURE — 80053 COMPREHEN METABOLIC PANEL: CPT

## 2023-08-24 PROCEDURE — 99285 EMERGENCY DEPT VISIT HI MDM: CPT

## 2023-08-24 PROCEDURE — 6370000000 HC RX 637 (ALT 250 FOR IP): Performed by: NURSE PRACTITIONER

## 2023-08-24 PROCEDURE — 83735 ASSAY OF MAGNESIUM: CPT

## 2023-08-24 PROCEDURE — 2580000003 HC RX 258: Performed by: NURSE PRACTITIONER

## 2023-08-24 PROCEDURE — 1200000000 HC SEMI PRIVATE

## 2023-08-24 PROCEDURE — 6360000002 HC RX W HCPCS: Performed by: NURSE PRACTITIONER

## 2023-08-24 PROCEDURE — 83605 ASSAY OF LACTIC ACID: CPT

## 2023-08-24 PROCEDURE — 87040 BLOOD CULTURE FOR BACTERIA: CPT

## 2023-08-24 PROCEDURE — 85025 COMPLETE CBC W/AUTO DIFF WBC: CPT

## 2023-08-24 RX ORDER — ACETAMINOPHEN 325 MG/1
650 TABLET ORAL EVERY 6 HOURS PRN
Status: DISCONTINUED | OUTPATIENT
Start: 2023-08-24 | End: 2023-08-28 | Stop reason: HOSPADM

## 2023-08-24 RX ORDER — ONDANSETRON 4 MG/1
4 TABLET, ORALLY DISINTEGRATING ORAL EVERY 8 HOURS PRN
Status: DISCONTINUED | OUTPATIENT
Start: 2023-08-24 | End: 2023-08-28 | Stop reason: HOSPADM

## 2023-08-24 RX ORDER — SODIUM CHLORIDE 0.9 % (FLUSH) 0.9 %
10 SYRINGE (ML) INJECTION PRN
Status: DISCONTINUED | OUTPATIENT
Start: 2023-08-24 | End: 2023-08-28 | Stop reason: HOSPADM

## 2023-08-24 RX ORDER — POTASSIUM CHLORIDE 20 MEQ/1
40 TABLET, EXTENDED RELEASE ORAL PRN
Status: DISCONTINUED | OUTPATIENT
Start: 2023-08-24 | End: 2023-08-28 | Stop reason: HOSPADM

## 2023-08-24 RX ORDER — SODIUM CHLORIDE 0.9 % (FLUSH) 0.9 %
10 SYRINGE (ML) INJECTION EVERY 12 HOURS SCHEDULED
Status: DISCONTINUED | OUTPATIENT
Start: 2023-08-24 | End: 2023-08-28 | Stop reason: HOSPADM

## 2023-08-24 RX ORDER — PANTOPRAZOLE SODIUM 40 MG/1
40 TABLET, DELAYED RELEASE ORAL
Status: DISCONTINUED | OUTPATIENT
Start: 2023-08-25 | End: 2023-08-28 | Stop reason: HOSPADM

## 2023-08-24 RX ORDER — MAGNESIUM SULFATE IN WATER 40 MG/ML
2000 INJECTION, SOLUTION INTRAVENOUS PRN
Status: DISCONTINUED | OUTPATIENT
Start: 2023-08-24 | End: 2023-08-28 | Stop reason: HOSPADM

## 2023-08-24 RX ORDER — SODIUM CHLORIDE 9 MG/ML
INJECTION, SOLUTION INTRAVENOUS PRN
Status: DISCONTINUED | OUTPATIENT
Start: 2023-08-24 | End: 2023-08-28 | Stop reason: HOSPADM

## 2023-08-24 RX ORDER — ENOXAPARIN SODIUM 100 MG/ML
30 INJECTION SUBCUTANEOUS 2 TIMES DAILY
Status: DISCONTINUED | OUTPATIENT
Start: 2023-08-24 | End: 2023-08-28 | Stop reason: HOSPADM

## 2023-08-24 RX ORDER — BACLOFEN 10 MG/1
10 TABLET ORAL EVERY 12 HOURS
COMMUNITY
Start: 2023-08-21

## 2023-08-24 RX ORDER — OMEPRAZOLE 20 MG/1
20 CAPSULE, DELAYED RELEASE ORAL DAILY
Status: DISCONTINUED | OUTPATIENT
Start: 2023-08-25 | End: 2023-08-24 | Stop reason: CLARIF

## 2023-08-24 RX ORDER — ROSUVASTATIN CALCIUM 5 MG/1
5 TABLET, COATED ORAL DAILY
Status: DISCONTINUED | OUTPATIENT
Start: 2023-08-25 | End: 2023-08-28 | Stop reason: HOSPADM

## 2023-08-24 RX ORDER — SENNOSIDES A AND B 8.6 MG/1
1 TABLET, FILM COATED ORAL DAILY PRN
Status: DISCONTINUED | OUTPATIENT
Start: 2023-08-24 | End: 2023-08-28 | Stop reason: HOSPADM

## 2023-08-24 RX ORDER — ONDANSETRON 2 MG/ML
4 INJECTION INTRAMUSCULAR; INTRAVENOUS EVERY 6 HOURS PRN
Status: DISCONTINUED | OUTPATIENT
Start: 2023-08-24 | End: 2023-08-28 | Stop reason: HOSPADM

## 2023-08-24 RX ORDER — MELOXICAM 7.5 MG/1
7.5 TABLET ORAL DAILY
Status: DISCONTINUED | OUTPATIENT
Start: 2023-08-25 | End: 2023-08-28 | Stop reason: HOSPADM

## 2023-08-24 RX ORDER — BACLOFEN 10 MG/1
10 TABLET ORAL EVERY 12 HOURS
Status: DISCONTINUED | OUTPATIENT
Start: 2023-08-24 | End: 2023-08-28 | Stop reason: HOSPADM

## 2023-08-24 RX ORDER — ASPIRIN 81 MG/1
81 TABLET, CHEWABLE ORAL DAILY
Status: DISCONTINUED | OUTPATIENT
Start: 2023-08-25 | End: 2023-08-28 | Stop reason: HOSPADM

## 2023-08-24 RX ORDER — NABUMETONE 500 MG/1
500 TABLET, FILM COATED ORAL 2 TIMES DAILY
Status: DISCONTINUED | OUTPATIENT
Start: 2023-08-24 | End: 2023-08-24 | Stop reason: CLARIF

## 2023-08-24 RX ORDER — ACETAMINOPHEN 650 MG/1
650 SUPPOSITORY RECTAL EVERY 6 HOURS PRN
Status: DISCONTINUED | OUTPATIENT
Start: 2023-08-24 | End: 2023-08-28 | Stop reason: HOSPADM

## 2023-08-24 RX ORDER — POTASSIUM CHLORIDE 7.45 MG/ML
10 INJECTION INTRAVENOUS PRN
Status: DISCONTINUED | OUTPATIENT
Start: 2023-08-24 | End: 2023-08-28 | Stop reason: HOSPADM

## 2023-08-24 RX ADMIN — WATER 2000 MG: 1 INJECTION INTRAMUSCULAR; INTRAVENOUS; SUBCUTANEOUS at 20:34

## 2023-08-24 RX ADMIN — BACLOFEN 10 MG: 10 TABLET ORAL at 23:33

## 2023-08-24 RX ADMIN — VANCOMYCIN HYDROCHLORIDE 2500 MG: 5 INJECTION, POWDER, LYOPHILIZED, FOR SOLUTION INTRAVENOUS at 20:38

## 2023-08-24 RX ADMIN — SODIUM CHLORIDE, PRESERVATIVE FREE 10 ML: 5 INJECTION INTRAVENOUS at 23:33

## 2023-08-24 ASSESSMENT — PAIN DESCRIPTION - ORIENTATION
ORIENTATION: RIGHT
ORIENTATION: RIGHT

## 2023-08-24 ASSESSMENT — PAIN DESCRIPTION - LOCATION
LOCATION: LEG
LOCATION: LEG

## 2023-08-24 ASSESSMENT — PAIN - FUNCTIONAL ASSESSMENT: PAIN_FUNCTIONAL_ASSESSMENT: 0-10

## 2023-08-24 ASSESSMENT — PAIN SCALES - GENERAL
PAINLEVEL_OUTOF10: 4
PAINLEVEL_OUTOF10: 3

## 2023-08-24 ASSESSMENT — PAIN DESCRIPTION - DESCRIPTORS: DESCRIPTORS: SHARP

## 2023-08-24 NOTE — ED NOTES
Department of Emergency Medicine  FIRST PROVIDER TRIAGE NOTE             Independent BHASKAR           8/24/23  5:18 PM EDT    Date of Encounter: 8/24/23   MRN: [id number]      HPI: Elysia Sepulveda is a 59 y.o. male who presents to the ED for Insect Bite (Seen earlier in the week for this insect bite, on anitbx)   Multiple evaluations for this wound. Has been on antibiotics but continues to be red and drain purulent drainage. ROS: Negative for cp or sob. PE: Gen Appearance/Constitutional: alert  HEENT: NC/NT. PERRLA,  Airway patent. Initial Plan of Care: All treatment areas with department are currently occupied. Plan to order/Initiate the following while awaiting opening in ED.   Initiate Treatment-Testing, Proceed toTreatment Area When Bed Available for ED Attending/BHASKAR to Continue Care    Electronically signed by CORIE Sanchez CNP   DD: 8/24/23      CORIE Sanchez CNP  08/24/23 [id number]

## 2023-08-24 NOTE — ED NOTES
The following labs were labeled with appropriate pt sticker and tubed to lab:     [] Blue     [x] Lavender   [] on ice  [x] Green/yellow  [] Green/black [] on ice  [x] Grey  [x] on ice  [] Yellow  [] Red  [] Type/ Screen  [] ABG  [] VBG    [] COVID-19 swab    [] Rapid  [] PCR  [] Flu swab  [] Peds Viral Panel     [] Urine Sample  [] Fecal Sample  [] Pelvic Cultures  [x] Blood Cultures  [x] X 2  [] STREP Cultures       Sari Hobbs RN  08/24/23 [id number]

## 2023-08-25 LAB
ALBUMIN SERPL-MCNC: 3.9 G/DL (ref 3.5–5.2)
ALP SERPL-CCNC: 54 U/L (ref 40–129)
ALT SERPL-CCNC: 19 U/L (ref 0–40)
ANION GAP SERPL CALCULATED.3IONS-SCNC: 12 MMOL/L (ref 7–16)
AST SERPL-CCNC: 20 U/L (ref 0–39)
BASOPHILS # BLD: 0.05 K/UL (ref 0–0.2)
BASOPHILS NFR BLD: 1 % (ref 0–2)
BILIRUB SERPL-MCNC: 0.5 MG/DL (ref 0–1.2)
BUN SERPL-MCNC: 15 MG/DL (ref 6–20)
CALCIUM SERPL-MCNC: 8.9 MG/DL (ref 8.6–10.2)
CHLORIDE SERPL-SCNC: 101 MMOL/L (ref 98–107)
CO2 SERPL-SCNC: 25 MMOL/L (ref 22–29)
CREAT SERPL-MCNC: 1 MG/DL (ref 0.7–1.2)
EOSINOPHIL # BLD: 0.18 K/UL (ref 0.05–0.5)
EOSINOPHILS RELATIVE PERCENT: 2 % (ref 0–6)
ERYTHROCYTE [DISTWIDTH] IN BLOOD BY AUTOMATED COUNT: 13.6 % (ref 11.5–15)
GFR SERPL CREATININE-BSD FRML MDRD: >60 ML/MIN/1.73M2
GLUCOSE SERPL-MCNC: 97 MG/DL (ref 74–99)
HCT VFR BLD AUTO: 46.1 % (ref 37–54)
HGB BLD-MCNC: 15.6 G/DL (ref 12.5–16.5)
IMM GRANULOCYTES # BLD AUTO: 0.04 K/UL (ref 0–0.58)
IMM GRANULOCYTES NFR BLD: 1 % (ref 0–5)
LYMPHOCYTES NFR BLD: 1.84 K/UL (ref 1.5–4)
LYMPHOCYTES RELATIVE PERCENT: 22 % (ref 20–42)
MCH RBC QN AUTO: 31.3 PG (ref 26–35)
MCHC RBC AUTO-ENTMCNC: 33.8 G/DL (ref 32–34.5)
MCV RBC AUTO: 92.6 FL (ref 80–99.9)
MICROORGANISM SPEC CULT: ABNORMAL
MICROORGANISM/AGENT SPEC: ABNORMAL
MONOCYTES NFR BLD: 0.78 K/UL (ref 0.1–0.95)
MONOCYTES NFR BLD: 9 % (ref 2–12)
NEUTROPHILS NFR BLD: 66 % (ref 43–80)
NEUTS SEG NFR BLD: 5.6 K/UL (ref 1.8–7.3)
PLATELET # BLD AUTO: 226 K/UL (ref 130–450)
PMV BLD AUTO: 9.8 FL (ref 7–12)
POTASSIUM SERPL-SCNC: 4.1 MMOL/L (ref 3.5–5)
PROT SERPL-MCNC: 6.2 G/DL (ref 6.4–8.3)
RBC # BLD AUTO: 4.98 M/UL (ref 3.8–5.8)
SODIUM SERPL-SCNC: 138 MMOL/L (ref 132–146)
SPECIMEN DESCRIPTION: ABNORMAL
WBC OTHER # BLD: 8.5 K/UL (ref 4.5–11.5)

## 2023-08-25 PROCEDURE — 6370000000 HC RX 637 (ALT 250 FOR IP): Performed by: STUDENT IN AN ORGANIZED HEALTH CARE EDUCATION/TRAINING PROGRAM

## 2023-08-25 PROCEDURE — 6360000002 HC RX W HCPCS: Performed by: NURSE PRACTITIONER

## 2023-08-25 PROCEDURE — 80053 COMPREHEN METABOLIC PANEL: CPT

## 2023-08-25 PROCEDURE — 6370000000 HC RX 637 (ALT 250 FOR IP): Performed by: NURSE PRACTITIONER

## 2023-08-25 PROCEDURE — 1200000000 HC SEMI PRIVATE

## 2023-08-25 PROCEDURE — 85025 COMPLETE CBC W/AUTO DIFF WBC: CPT

## 2023-08-25 PROCEDURE — 86668 FRANCISELLA TULARENSIS: CPT

## 2023-08-25 PROCEDURE — 36415 COLL VENOUS BLD VENIPUNCTURE: CPT

## 2023-08-25 PROCEDURE — 2580000003 HC RX 258: Performed by: NURSE PRACTITIONER

## 2023-08-25 PROCEDURE — 6360000002 HC RX W HCPCS: Performed by: STUDENT IN AN ORGANIZED HEALTH CARE EDUCATION/TRAINING PROGRAM

## 2023-08-25 PROCEDURE — 86618 LYME DISEASE ANTIBODY: CPT

## 2023-08-25 PROCEDURE — 2580000003 HC RX 258: Performed by: STUDENT IN AN ORGANIZED HEALTH CARE EDUCATION/TRAINING PROGRAM

## 2023-08-25 RX ORDER — DOXYCYCLINE HYCLATE 100 MG/1
100 CAPSULE ORAL EVERY 12 HOURS SCHEDULED
Status: DISCONTINUED | OUTPATIENT
Start: 2023-08-25 | End: 2023-08-28 | Stop reason: HOSPADM

## 2023-08-25 RX ADMIN — ASPIRIN 81 MG CHEWABLE TABLET 81 MG: 81 TABLET CHEWABLE at 09:16

## 2023-08-25 RX ADMIN — WATER 2000 MG: 1 INJECTION INTRAMUSCULAR; INTRAVENOUS; SUBCUTANEOUS at 04:15

## 2023-08-25 RX ADMIN — BACLOFEN 10 MG: 10 TABLET ORAL at 09:16

## 2023-08-25 RX ADMIN — WATER 2000 MG: 1 INJECTION INTRAMUSCULAR; INTRAVENOUS; SUBCUTANEOUS at 12:29

## 2023-08-25 RX ADMIN — ENOXAPARIN SODIUM 30 MG: 100 INJECTION SUBCUTANEOUS at 09:16

## 2023-08-25 RX ADMIN — SODIUM CHLORIDE, PRESERVATIVE FREE 10 ML: 5 INJECTION INTRAVENOUS at 12:32

## 2023-08-25 RX ADMIN — SODIUM CHLORIDE, PRESERVATIVE FREE 10 ML: 5 INJECTION INTRAVENOUS at 20:23

## 2023-08-25 RX ADMIN — ENOXAPARIN SODIUM 30 MG: 100 INJECTION SUBCUTANEOUS at 20:21

## 2023-08-25 RX ADMIN — WATER 2000 MG: 1 INJECTION INTRAMUSCULAR; INTRAVENOUS; SUBCUTANEOUS at 17:52

## 2023-08-25 RX ADMIN — PANTOPRAZOLE SODIUM 40 MG: 40 TABLET, DELAYED RELEASE ORAL at 06:48

## 2023-08-25 RX ADMIN — BACLOFEN 10 MG: 10 TABLET ORAL at 20:21

## 2023-08-25 RX ADMIN — DOXYCYCLINE HYCLATE 100 MG: 100 CAPSULE ORAL at 20:22

## 2023-08-25 RX ADMIN — ROSUVASTATIN CALCIUM 5 MG: 5 TABLET, FILM COATED ORAL at 20:21

## 2023-08-25 RX ADMIN — MELOXICAM 7.5 MG: 7.5 TABLET ORAL at 09:17

## 2023-08-25 RX ADMIN — SODIUM CHLORIDE, PRESERVATIVE FREE 10 ML: 5 INJECTION INTRAVENOUS at 09:17

## 2023-08-25 NOTE — H&P
Internal Medicine History & Physical     Name: Hilton Mon  : 1965  Chief Complaint: Insect Bite (Seen earlier in the week for this insect bite, on anitbx)  Primary Care Physician: Ann-Marie Gruber MD  Admission date: 2023  Date of service: 2023     History of Present Illness  Sheila Ambriz is a 59y.o. year old male. Patient was cleaning out a stadium with asphalt, steel, and dust. States he was wearing shorts. He had concern of bug bites, but the lesions were on the skin below his socks. Patient had this start about 2 weeks ago. He has been on bactrim and keflex and the redness and swelling has continued to worsen. Tolerating diet. No fever or chills, cp, sob, n/v, ha. Nothing else is helping at this time. ED course:   Initial blood work and imaging studies performed. Admission recommended by ED physician. Case was discussed with ED provider.  Meds in ED consisted of the following:  Medications   aspirin chewable tablet 81 mg (81 mg Oral Given 23 0916)   baclofen (LIORESAL) tablet 10 mg (10 mg Oral Given 23 0916)   rosuvastatin (CRESTOR) tablet 5 mg (0 mg Oral Held 23 0900)   ceFAZolin (ANCEF) 2,000 mg in sterile water 20 mL IV syringe (2,000 mg IntraVENous Given 23 1229)   pantoprazole (PROTONIX) tablet 40 mg (40 mg Oral Given 23 0648)   sodium chloride flush 0.9 % injection 10 mL (10 mLs IntraVENous Given 23 0917)   sodium chloride flush 0.9 % injection 10 mL (10 mLs IntraVENous Given 23 1232)   0.9 % sodium chloride infusion (has no administration in time range)   potassium chloride (KLOR-CON M) extended release tablet 40 mEq (has no administration in time range)     Or   potassium bicarb-citric acid (EFFER-K) effervescent tablet 40 mEq (has no administration in time range)     Or   potassium chloride 10 mEq/100 mL IVPB (Peripheral Line) (has no administration in time range)   magnesium sulfate 2000 mg in 50 mL IVPB premix (has no administration in time

## 2023-08-25 NOTE — CARE COORDINATION
Pt independent from home with spouse. 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 Never saw what it was that bit him. Has been on 3 different oral atb's with no improvement. (Doxy/keflex, augmentin). Admitted for cellulitis. ID to see. [facility] [address] follow for,possible d/c needs. (Pt has no C preference). Iv ancef. PCP Carter Farah. Plan is home, needs at d/c TBD. Anna Paul.

## 2023-08-25 NOTE — CARE COORDINATION
Internal Medicine On-call Care Coordination Note    I was called by the ED physician because they recommended admission for this patient and we cover their PCP. The history as I understand it after discussion with the ED physician is as follows:    3rd visit for insect bite/ cellulitis  Failed OP ATB x 3  US soft tissue negative for abscess    I placed admission orders. Including:    IV ancef   ID consult    Dr. Debra Covington, or our coverage will see the patient tomorrow for H&P.     Electronically signed by CORIE Cohen CNP on 8/24/2023 at 9:35 PM

## 2023-08-25 NOTE — ED PROVIDER NOTES
[facility] SURG/TELE  ED  Encounter Note  Admit Date/RoomTime: 8/24/2023  6:10 PM  ED Room: 0533/4611-Z  Shared SANDER-ED Attending Visit. CC: No        HPI:  8/24/23,   Time: 8:31 PM EDT         Miriam Najjar is a 59 y.o. male presenting to the ED for wound check to the right lower leg, beginning 10 days ago. The complaint has been persistent, moderate in severity, and worsened by nothing. Presents for complaints of pain to the right lateral ankle secondary to an insect bite which occurred 10 days ago. This is his third visit to the emergency room for the same complaint and concern of a worsening infection to the right ankle. He has been on 3 different antibiotics and is currently taking Augmentin. He was seen here 3 days ago for the same complaint had an ultrasound the right lower extremity which was negative for DVT and also a negative ultrasound soft tissue for any evidence of abscess. He had no leukocytosis but started on Augmentin at that time. He states he continues to have ongoing swelling and pain but denies any fever. Tetanus is up-to-date. ROS:   Pertinent positives and negatives are stated within HPI, all other systems reviewed and are negative.  --------------------------------------------- PAST HISTORY ---------------------------------------------  Past Medical History:  has a past medical history of Cervical radiculopathy, Family history of prostate cancer, GERD (gastroesophageal reflux disease), Hyperlipidemia, Impaired fasting glucose, Testosterone deficiency, and TIA (transient ischemic attack). Past Surgical History:  has a past surgical history that includes Vasectomy; Refractive surgery; Tonsillectomy; Colonoscopy; and Cardiac valve replacement. Social History:  reports that he has quit smoking. He has never used smokeless tobacco. He reports current alcohol use. He reports that he does not use drugs. Family History: family history includes Cancer in his father.      The range)   ceFAZolin (ANCEF) 2,000 mg in sterile water 20 mL IV syringe (has no administration in time range)   pantoprazole (PROTONIX) tablet 40 mg (has no administration in time range)   sodium chloride flush 0.9 % injection 10 mL (10 mLs IntraVENous Given 8/24/23 2333)   sodium chloride flush 0.9 % injection 10 mL (has no administration in time range)   0.9 % sodium chloride infusion (has no administration in time range)   potassium chloride (KLOR-CON M) extended release tablet 40 mEq (has no administration in time range)     Or   potassium bicarb-citric acid (EFFER-K) effervescent tablet 40 mEq (has no administration in time range)     Or   potassium chloride 10 mEq/100 mL IVPB (Peripheral Line) (has no administration in time range)   magnesium sulfate 2000 mg in 50 mL IVPB premix (has no administration in time range)   enoxaparin Sodium (LOVENOX) injection 30 mg (30 mg SubCUTAneous Not Given 8/24/23 2312)   ondansetron (ZOFRAN-ODT) disintegrating tablet 4 mg (has no administration in time range)     Or   ondansetron (ZOFRAN) injection 4 mg (has no administration in time range)   senna (SENOKOT) tablet 8.6 mg (has no administration in time range)   acetaminophen (TYLENOL) tablet 650 mg (has no administration in time range)     Or   acetaminophen (TYLENOL) suppository 650 mg (has no administration in time range)   meloxicam (MOBIC) tablet 7.5 mg (has no administration in time range)   vancomycin (VANCOCIN) 2,500 mg in sodium chloride 0.9 % 500 mL IVPB (0 mg IntraVENous Stopped 8/24/23 2313)   ceFAZolin (ANCEF) 2,000 mg in sterile water 20 mL IV syringe (2,000 mg IntraVENous Given 8/24/23 2034)         Medical Decision Making: presenting to the ED for wound check to the right lower leg, beginning 10 days ago. The complaint has been persistent, moderate in severity, and worsened by nothing. Presents for complaints of pain to the right lateral ankle secondary to an insect bite which occurred 10 days ago.   This is

## 2023-08-25 NOTE — PROGRESS NOTES
OCCUPATIONAL THERAPY INITIAL SCREEN  [facility] [address] [facility]    Date:2023  Patient Name: Daniel Caban  MRN: [id number]  : 1965  ROOM #: [id number]      OT orders received, chart reviewed, and screen completed. Pt states he has been up independently in room/bathroom this admission and reports no questions/concerns from an OT standpoint at this time. Skilled OT services not warranted. OT will discontinue orders at this time. Please reorder OT if there is a change in patient's physical/medical status.      Thank you,    Keshav Lomeli OTR/L #[id number]

## 2023-08-26 LAB
ALBUMIN SERPL-MCNC: 4 G/DL (ref 3.5–5.2)
ALP SERPL-CCNC: 59 U/L (ref 40–129)
ALT SERPL-CCNC: 18 U/L (ref 0–40)
ANION GAP SERPL CALCULATED.3IONS-SCNC: 12 MMOL/L (ref 7–16)
AST SERPL-CCNC: 20 U/L (ref 0–39)
BASOPHILS # BLD: 0.05 K/UL (ref 0–0.2)
BASOPHILS NFR BLD: 1 % (ref 0–2)
BILIRUB SERPL-MCNC: 0.2 MG/DL (ref 0–1.2)
BUN SERPL-MCNC: 14 MG/DL (ref 6–20)
CALCIUM SERPL-MCNC: 9.1 MG/DL (ref 8.6–10.2)
CHLORIDE SERPL-SCNC: 101 MMOL/L (ref 98–107)
CO2 SERPL-SCNC: 26 MMOL/L (ref 22–29)
CREAT SERPL-MCNC: 0.9 MG/DL (ref 0.7–1.2)
EOSINOPHIL # BLD: 0.21 K/UL (ref 0.05–0.5)
EOSINOPHILS RELATIVE PERCENT: 3 % (ref 0–6)
ERYTHROCYTE [DISTWIDTH] IN BLOOD BY AUTOMATED COUNT: 13.3 % (ref 11.5–15)
GFR SERPL CREATININE-BSD FRML MDRD: >60 ML/MIN/1.73M2
GLUCOSE SERPL-MCNC: 125 MG/DL (ref 74–99)
HCT VFR BLD AUTO: 46.3 % (ref 37–54)
HGB BLD-MCNC: 15.9 G/DL (ref 12.5–16.5)
IMM GRANULOCYTES # BLD AUTO: <0.03 K/UL (ref 0–0.58)
IMM GRANULOCYTES NFR BLD: 0 % (ref 0–5)
LYMPHOCYTES NFR BLD: 2.05 K/UL (ref 1.5–4)
LYMPHOCYTES RELATIVE PERCENT: 28 % (ref 20–42)
MCH RBC QN AUTO: 31.4 PG (ref 26–35)
MCHC RBC AUTO-ENTMCNC: 34.3 G/DL (ref 32–34.5)
MCV RBC AUTO: 91.5 FL (ref 80–99.9)
MICROORGANISM SPEC CULT: ABNORMAL
MICROORGANISM SPEC CULT: NORMAL
MICROORGANISM SPEC CULT: NORMAL
MICROORGANISM/AGENT SPEC: ABNORMAL
MONOCYTES NFR BLD: 0.77 K/UL (ref 0.1–0.95)
MONOCYTES NFR BLD: 10 % (ref 2–12)
NEUTROPHILS NFR BLD: 58 % (ref 43–80)
NEUTS SEG NFR BLD: 4.32 K/UL (ref 1.8–7.3)
PLATELET # BLD AUTO: 240 K/UL (ref 130–450)
PMV BLD AUTO: 9.8 FL (ref 7–12)
POTASSIUM SERPL-SCNC: 3.7 MMOL/L (ref 3.5–5)
PROT SERPL-MCNC: 6.4 G/DL (ref 6.4–8.3)
RBC # BLD AUTO: 5.06 M/UL (ref 3.8–5.8)
SERVICE CMNT-IMP: NORMAL
SERVICE CMNT-IMP: NORMAL
SODIUM SERPL-SCNC: 139 MMOL/L (ref 132–146)
SPECIMEN DESCRIPTION: ABNORMAL
SPECIMEN DESCRIPTION: NORMAL
SPECIMEN DESCRIPTION: NORMAL
WBC OTHER # BLD: 7.4 K/UL (ref 4.5–11.5)

## 2023-08-26 PROCEDURE — 6370000000 HC RX 637 (ALT 250 FOR IP): Performed by: NURSE PRACTITIONER

## 2023-08-26 PROCEDURE — 2580000003 HC RX 258: Performed by: NURSE PRACTITIONER

## 2023-08-26 PROCEDURE — 2580000003 HC RX 258: Performed by: STUDENT IN AN ORGANIZED HEALTH CARE EDUCATION/TRAINING PROGRAM

## 2023-08-26 PROCEDURE — 85025 COMPLETE CBC W/AUTO DIFF WBC: CPT

## 2023-08-26 PROCEDURE — 6370000000 HC RX 637 (ALT 250 FOR IP): Performed by: STUDENT IN AN ORGANIZED HEALTH CARE EDUCATION/TRAINING PROGRAM

## 2023-08-26 PROCEDURE — 1200000000 HC SEMI PRIVATE

## 2023-08-26 PROCEDURE — 6360000002 HC RX W HCPCS: Performed by: STUDENT IN AN ORGANIZED HEALTH CARE EDUCATION/TRAINING PROGRAM

## 2023-08-26 PROCEDURE — 6360000002 HC RX W HCPCS: Performed by: NURSE PRACTITIONER

## 2023-08-26 PROCEDURE — 80053 COMPREHEN METABOLIC PANEL: CPT

## 2023-08-26 RX ADMIN — WATER 2000 MG: 1 INJECTION INTRAMUSCULAR; INTRAVENOUS; SUBCUTANEOUS at 17:32

## 2023-08-26 RX ADMIN — MELOXICAM 7.5 MG: 7.5 TABLET ORAL at 07:58

## 2023-08-26 RX ADMIN — DOXYCYCLINE HYCLATE 100 MG: 100 CAPSULE ORAL at 07:58

## 2023-08-26 RX ADMIN — SODIUM CHLORIDE, PRESERVATIVE FREE 10 ML: 5 INJECTION INTRAVENOUS at 20:43

## 2023-08-26 RX ADMIN — ROSUVASTATIN CALCIUM 5 MG: 5 TABLET, FILM COATED ORAL at 20:43

## 2023-08-26 RX ADMIN — ASPIRIN 81 MG CHEWABLE TABLET 81 MG: 81 TABLET CHEWABLE at 07:58

## 2023-08-26 RX ADMIN — SODIUM CHLORIDE, PRESERVATIVE FREE 10 ML: 5 INJECTION INTRAVENOUS at 07:58

## 2023-08-26 RX ADMIN — DOXYCYCLINE HYCLATE 100 MG: 100 CAPSULE ORAL at 20:43

## 2023-08-26 RX ADMIN — BACLOFEN 10 MG: 10 TABLET ORAL at 07:58

## 2023-08-26 RX ADMIN — ENOXAPARIN SODIUM 30 MG: 100 INJECTION SUBCUTANEOUS at 20:43

## 2023-08-26 RX ADMIN — PANTOPRAZOLE SODIUM 40 MG: 40 TABLET, DELAYED RELEASE ORAL at 05:38

## 2023-08-26 RX ADMIN — BACLOFEN 10 MG: 10 TABLET ORAL at 20:43

## 2023-08-26 ASSESSMENT — PAIN SCALES - GENERAL: PAINLEVEL_OUTOF10: 0

## 2023-08-26 NOTE — PROGRESS NOTES
Internal Medicine Progress Note    Admission date: 8/24/2023  Primary care physician: Sarah Henley MD    Subjective  Josse Chairez was seen and examined at bedside today. Wife was present during my examination. Patient states that his right lower extremity is improving from the cellulitis. He otherwise feels relatively fine. Review of Systems  There are no new complaints of chest pain, shortness of breath, abdominal pain, nausea, vomiting, diarrhea, constipation, headaches, fevers, chills, lightheadedness, dizziness, calf pain.     Hospital Medications  Current Facility-Administered Medications   Medication Dose Route Frequency Provider Last Rate Last Admin    cefTRIAXone (ROCEPHIN) 2,000 mg in sterile water 20 mL IV syringe  2,000 mg IntraVENous Q24H Melissa Mcdonough MD   2,000 mg at 08/25/23 1752    doxycycline hyclate (VIBRAMYCIN) capsule 100 mg  100 mg Oral 2 times per day Melissa Mcdonough MD   100 mg at 08/26/23 0758    aspirin chewable tablet 81 mg  81 mg Oral Daily Brandon Yorklyn, APRN - CNP   81 mg at 08/26/23 0758    baclofen (LIORESAL) tablet 10 mg  10 mg Oral Q12H Brandon Yorklyn, APRN - CNP   10 mg at 08/26/23 0758    rosuvastatin (CRESTOR) tablet 5 mg  5 mg Oral Daily Brandon Yorklyn, APRN - CNP   5 mg at 08/25/23 2021    pantoprazole (PROTONIX) tablet 40 mg  40 mg Oral QAM AC Brandon Yorklyn, APRN - CNP   40 mg at 08/26/23 0538    sodium chloride flush 0.9 % injection 10 mL  10 mL IntraVENous 2 times per day Brandon Yorklyn, APRN - CNP   10 mL at 08/26/23 0758    sodium chloride flush 0.9 % injection 10 mL  10 mL IntraVENous PRN Brandon Yorklyn, APRN - CNP   10 mL at 08/25/23 1232    0.9 % sodium chloride infusion   IntraVENous PRN Brandon Yorklyn, APRN - CNP        potassium chloride (KLOR-CON M) extended release tablet 40 mEq  40 mEq Oral PRN Brandon Yorklyn, APRN - CNP        Or    potassium bicarb-citric acid (EFFER-K) effervescent tablet 40 mEq  40 mEq Oral PRN Brandon Yorklyn, APRN - CNP thrombus. The veins demonstrate good compressibility with normal color flow study and spectral analysis. No evidence of DVT in the right lower extremity. US SOFT TISSUE LIMITED AREA    Result Date: 8/21/2023  EXAMINATION: SOFT TISSUE ULTRASOUND 8/21/2023 7:31 pm COMPARISON: None. HISTORY: ORDERING SYSTEM PROVIDED HISTORY: ro abscess right lateral lower leg TECHNOLOGIST PROVIDED HISTORY: Reason for exam:->ro abscess right lateral lower leg FINDINGS: 4 mm x 4 mm x 2 mm possible focus of fluid in the right lateral ankle in the region of the patient's insect bite. No evidence for abscess. 4 mm probable focus of free fluid in the region of the right lateral ankle. No evidence for abscess. Assessment/Plan  Parag Sun is a 59y.o. year old male who presented on 8/24/2023 and is being treated for Cellulitis . Complete Problem List:    Patient Active Problem List    Diagnosis Date Noted    Cellulitis 08/24/2023    Hyperlipidemia     GERD (gastroesophageal reflux disease)     Impaired fasting glucose     Family history of prostate cancer     Testosterone deficiency     Cervical radiculopathy 12/16/2013    TIA (transient ischemic attack) 12/16/2013     Right Leg Cellulitis/Erythema Nodosum  Appreciate ID recommendations  Abx as per ID  Pending lyme/tularemia serologies  He is improving. Hyperlipidemia  Continue home statin    GERD  Continue PPI      Routine labs in am  DVT prophylaxis  Please see orders for further management and care. Discharge to home likely once medically improved    Electronically signed by Justin Arnold MD on 8/26/2023 at 11:42 AM      NOTE:  This report was transcribed using voice recognition software. Every effort was made to ensure accuracy; however, inadvertent computerized transcription errors may be present.

## 2023-08-26 NOTE — PROGRESS NOTES
4 Eyes Skin Assessment     NAME:  Marcelline Mohs  YOB: 1965  MEDICAL RECORD NUMBER:  [id number]    The patient is being assessed for  Admission    I agree that at least one RN has performed a thorough Head to Toe Skin Assessment on the patient. ALL assessment sites listed below have been assessed. Areas assessed by both nurses:    Head, Face, Ears, Shoulders, Back, Chest, Arms, Elbows, Hands, Sacrum. Buttock, Coccyx, Ischium, Legs. Feet and Heels, and Under Medical Devices         Does the Patient have a Wound? Yes wound(s) were present on assessment.  LDA wound assessment was Initiated and completed by RN    RLE - bug bite on right ankle, reddened, warm, raised areas to R shin and inner R thigh       Vinicio Prevention initiated by RN: No  Wound Care Orders initiated by RN: No    Pressure Injury (Stage 3,4, Unstageable, DTI, NWPT, and Complex wounds) if present, place Wound referral order by RN under : No    New Ostomies, if present place, Ostomy referral order under : No     Nurse 1 eSignature: Electronically signed by Rose Sanderson RN on 8/25/23 at 8:08 PM EDT    **SHARE this note so that the co-signing nurse can place an eSignature**    Nurse 2 eSignature: {Esignature:[id number]}

## 2023-08-26 NOTE — PLAN OF CARE
Problem: Discharge Planning  Goal: Discharge to home or other facility with appropriate resources  8/26/2023 1046 by Rossi Alvarez RN  Outcome: Progressing  8/26/2023 0103 by Ceferino Yu RN  Outcome: Progressing     Problem: Pain  Goal: Verbalizes/displays adequate comfort level or baseline comfort level  8/26/2023 1046 by Rossi Alvarez RN  Outcome: Progressing  8/26/2023 0103 by Ceferino Yu RN  Outcome: Progressing

## 2023-08-27 LAB
ALBUMIN SERPL-MCNC: 4 G/DL (ref 3.5–5.2)
ALP SERPL-CCNC: 54 U/L (ref 40–129)
ALT SERPL-CCNC: 20 U/L (ref 0–40)
ANION GAP SERPL CALCULATED.3IONS-SCNC: 11 MMOL/L (ref 7–16)
AST SERPL-CCNC: 22 U/L (ref 0–39)
BASOPHILS # BLD: 0.05 K/UL (ref 0–0.2)
BASOPHILS NFR BLD: 1 % (ref 0–2)
BILIRUB SERPL-MCNC: 0.2 MG/DL (ref 0–1.2)
BUN SERPL-MCNC: 12 MG/DL (ref 6–20)
CALCIUM SERPL-MCNC: 9.1 MG/DL (ref 8.6–10.2)
CHLORIDE SERPL-SCNC: 102 MMOL/L (ref 98–107)
CO2 SERPL-SCNC: 26 MMOL/L (ref 22–29)
CREAT SERPL-MCNC: 0.9 MG/DL (ref 0.7–1.2)
EOSINOPHIL # BLD: 0.19 K/UL (ref 0.05–0.5)
EOSINOPHILS RELATIVE PERCENT: 3 % (ref 0–6)
ERYTHROCYTE [DISTWIDTH] IN BLOOD BY AUTOMATED COUNT: 13.3 % (ref 11.5–15)
GFR SERPL CREATININE-BSD FRML MDRD: >60 ML/MIN/1.73M2
GLUCOSE SERPL-MCNC: 108 MG/DL (ref 74–99)
HCT VFR BLD AUTO: 46.8 % (ref 37–54)
HGB BLD-MCNC: 16.1 G/DL (ref 12.5–16.5)
IMM GRANULOCYTES # BLD AUTO: <0.03 K/UL (ref 0–0.58)
IMM GRANULOCYTES NFR BLD: 0 % (ref 0–5)
LYMPHOCYTES NFR BLD: 2.38 K/UL (ref 1.5–4)
LYMPHOCYTES RELATIVE PERCENT: 32 % (ref 20–42)
MCH RBC QN AUTO: 31.5 PG (ref 26–35)
MCHC RBC AUTO-ENTMCNC: 34.4 G/DL (ref 32–34.5)
MCV RBC AUTO: 91.6 FL (ref 80–99.9)
MONOCYTES NFR BLD: 0.68 K/UL (ref 0.1–0.95)
MONOCYTES NFR BLD: 9 % (ref 2–12)
NEUTROPHILS NFR BLD: 55 % (ref 43–80)
NEUTS SEG NFR BLD: 4.09 K/UL (ref 1.8–7.3)
PLATELET # BLD AUTO: 244 K/UL (ref 130–450)
PMV BLD AUTO: 9.5 FL (ref 7–12)
POTASSIUM SERPL-SCNC: 4 MMOL/L (ref 3.5–5)
PROT SERPL-MCNC: 6.4 G/DL (ref 6.4–8.3)
RBC # BLD AUTO: 5.11 M/UL (ref 3.8–5.8)
SODIUM SERPL-SCNC: 139 MMOL/L (ref 132–146)
WBC OTHER # BLD: 7.4 K/UL (ref 4.5–11.5)

## 2023-08-27 PROCEDURE — 6360000002 HC RX W HCPCS: Performed by: STUDENT IN AN ORGANIZED HEALTH CARE EDUCATION/TRAINING PROGRAM

## 2023-08-27 PROCEDURE — 1200000000 HC SEMI PRIVATE

## 2023-08-27 PROCEDURE — 80053 COMPREHEN METABOLIC PANEL: CPT

## 2023-08-27 PROCEDURE — 6370000000 HC RX 637 (ALT 250 FOR IP): Performed by: NURSE PRACTITIONER

## 2023-08-27 PROCEDURE — 2580000003 HC RX 258: Performed by: STUDENT IN AN ORGANIZED HEALTH CARE EDUCATION/TRAINING PROGRAM

## 2023-08-27 PROCEDURE — 85025 COMPLETE CBC W/AUTO DIFF WBC: CPT

## 2023-08-27 PROCEDURE — 2580000003 HC RX 258: Performed by: NURSE PRACTITIONER

## 2023-08-27 PROCEDURE — 6360000002 HC RX W HCPCS: Performed by: NURSE PRACTITIONER

## 2023-08-27 PROCEDURE — 6370000000 HC RX 637 (ALT 250 FOR IP): Performed by: STUDENT IN AN ORGANIZED HEALTH CARE EDUCATION/TRAINING PROGRAM

## 2023-08-27 RX ADMIN — DOXYCYCLINE HYCLATE 100 MG: 100 CAPSULE ORAL at 22:12

## 2023-08-27 RX ADMIN — ASPIRIN 81 MG CHEWABLE TABLET 81 MG: 81 TABLET CHEWABLE at 08:16

## 2023-08-27 RX ADMIN — ENOXAPARIN SODIUM 30 MG: 100 INJECTION SUBCUTANEOUS at 22:12

## 2023-08-27 RX ADMIN — PANTOPRAZOLE SODIUM 40 MG: 40 TABLET, DELAYED RELEASE ORAL at 05:53

## 2023-08-27 RX ADMIN — SODIUM CHLORIDE, PRESERVATIVE FREE 10 ML: 5 INJECTION INTRAVENOUS at 08:16

## 2023-08-27 RX ADMIN — BACLOFEN 10 MG: 10 TABLET ORAL at 22:12

## 2023-08-27 RX ADMIN — ENOXAPARIN SODIUM 30 MG: 100 INJECTION SUBCUTANEOUS at 08:16

## 2023-08-27 RX ADMIN — BACLOFEN 10 MG: 10 TABLET ORAL at 08:16

## 2023-08-27 RX ADMIN — DOXYCYCLINE HYCLATE 100 MG: 100 CAPSULE ORAL at 08:16

## 2023-08-27 RX ADMIN — MELOXICAM 7.5 MG: 7.5 TABLET ORAL at 08:16

## 2023-08-27 RX ADMIN — ROSUVASTATIN CALCIUM 5 MG: 5 TABLET, FILM COATED ORAL at 22:12

## 2023-08-27 RX ADMIN — SODIUM CHLORIDE, PRESERVATIVE FREE 10 ML: 5 INJECTION INTRAVENOUS at 22:13

## 2023-08-27 RX ADMIN — WATER 2000 MG: 1 INJECTION INTRAMUSCULAR; INTRAVENOUS; SUBCUTANEOUS at 16:17

## 2023-08-27 NOTE — PLAN OF CARE
Problem: Discharge Planning  Goal: Discharge to home or other facility with appropriate resources  8/27/2023 1030 by Gracy Naranjo RN  Outcome: Progressing  8/27/2023 0048 by Maria Elena Kwon RN  Outcome: Progressing     Problem: Pain  Goal: Verbalizes/displays adequate comfort level or baseline comfort level  8/27/2023 0048 by Maria Elena Kwon RN  Outcome: Progressing

## 2023-08-27 NOTE — PROGRESS NOTES
Internal Medicine Progress Note    Admission date: 8/24/2023  Primary care physician: Joon Santiago MD    Subjective  Thomas Richardson was seen and examined at bedside today. Overall the patient states that he feels absolutely fine. No real pain in the legs. Review of Systems  There are no new complaints of chest pain, shortness of breath, abdominal pain, nausea, vomiting, diarrhea, constipation, headaches, fevers, chills, lightheadedness, dizziness, calf pain.     Hospital Medications  Current Facility-Administered Medications   Medication Dose Route Frequency Provider Last Rate Last Admin    cefTRIAXone (ROCEPHIN) 2,000 mg in sterile water 20 mL IV syringe  2,000 mg IntraVENous Q24H Roosevelt Marcial MD   2,000 mg at 08/26/23 1732    doxycycline hyclate (VIBRAMYCIN) capsule 100 mg  100 mg Oral 2 times per day Roosevelt Marcial MD   100 mg at 08/27/23 0816    aspirin chewable tablet 81 mg  81 mg Oral Daily Len Mealy, APRN - CNP   81 mg at 08/27/23 0816    baclofen (LIORESAL) tablet 10 mg  10 mg Oral Q12H Len Mealy, APRN - CNP   10 mg at 08/27/23 0816    rosuvastatin (CRESTOR) tablet 5 mg  5 mg Oral Daily Len Mealy, APRN - CNP   5 mg at 08/26/23 2043    pantoprazole (PROTONIX) tablet 40 mg  40 mg Oral QAM AC Len Mealy, APRN - CNP   40 mg at 08/27/23 0553    sodium chloride flush 0.9 % injection 10 mL  10 mL IntraVENous 2 times per day Len Mealy, APRN - CNP   10 mL at 08/27/23 0816    sodium chloride flush 0.9 % injection 10 mL  10 mL IntraVENous PRN Len Mealy, APRN - CNP   10 mL at 08/25/23 1232    0.9 % sodium chloride infusion   IntraVENous PRN Len Mealy, APRN - CNP        potassium chloride (KLOR-CON M) extended release tablet 40 mEq  40 mEq Oral PRN Len Mealy, APRN - CNP        Or    potassium bicarb-citric acid (EFFER-K) effervescent tablet 40 mEq  40 mEq Oral PRN Len Mealy, APRN - CNP        Or    potassium chloride 10 mEq/100 mL IVPB (Peripheral Line)  10 mEq

## 2023-08-28 VITALS
BODY MASS INDEX: 33.94 KG/M2 | RESPIRATION RATE: 18 BRPM | SYSTOLIC BLOOD PRESSURE: 141 MMHG | TEMPERATURE: 97.8 F | HEIGHT: 72 IN | WEIGHT: 250.6 LBS | DIASTOLIC BLOOD PRESSURE: 73 MMHG | HEART RATE: 61 BPM | OXYGEN SATURATION: 97 %

## 2023-08-28 LAB
ALBUMIN SERPL-MCNC: 4 G/DL (ref 3.5–5.2)
ALP SERPL-CCNC: 52 U/L (ref 40–129)
ALT SERPL-CCNC: 23 U/L (ref 0–40)
ANION GAP SERPL CALCULATED.3IONS-SCNC: 10 MMOL/L (ref 7–16)
AST SERPL-CCNC: 26 U/L (ref 0–39)
BASOPHILS # BLD: 0.05 K/UL (ref 0–0.2)
BASOPHILS NFR BLD: 1 % (ref 0–2)
BILIRUB SERPL-MCNC: 0.2 MG/DL (ref 0–1.2)
BUN SERPL-MCNC: 13 MG/DL (ref 6–20)
CALCIUM SERPL-MCNC: 9.5 MG/DL (ref 8.6–10.2)
CHLORIDE SERPL-SCNC: 105 MMOL/L (ref 98–107)
CO2 SERPL-SCNC: 27 MMOL/L (ref 22–29)
CREAT SERPL-MCNC: 0.9 MG/DL (ref 0.7–1.2)
EOSINOPHIL # BLD: 0.16 K/UL (ref 0.05–0.5)
EOSINOPHILS RELATIVE PERCENT: 3 % (ref 0–6)
ERYTHROCYTE [DISTWIDTH] IN BLOOD BY AUTOMATED COUNT: 13.3 % (ref 11.5–15)
GFR SERPL CREATININE-BSD FRML MDRD: >60 ML/MIN/1.73M2
GLUCOSE SERPL-MCNC: 105 MG/DL (ref 74–99)
HCT VFR BLD AUTO: 47.1 % (ref 37–54)
HGB BLD-MCNC: 16.1 G/DL (ref 12.5–16.5)
IMM GRANULOCYTES # BLD AUTO: 0.03 K/UL (ref 0–0.58)
IMM GRANULOCYTES NFR BLD: 1 % (ref 0–5)
LYMPHOCYTES NFR BLD: 2.08 K/UL (ref 1.5–4)
LYMPHOCYTES RELATIVE PERCENT: 32 % (ref 20–42)
MCH RBC QN AUTO: 31.3 PG (ref 26–35)
MCHC RBC AUTO-ENTMCNC: 34.2 G/DL (ref 32–34.5)
MCV RBC AUTO: 91.5 FL (ref 80–99.9)
MONOCYTES NFR BLD: 0.59 K/UL (ref 0.1–0.95)
MONOCYTES NFR BLD: 9 % (ref 2–12)
NEUTROPHILS NFR BLD: 55 % (ref 43–80)
NEUTS SEG NFR BLD: 3.55 K/UL (ref 1.8–7.3)
PLATELET # BLD AUTO: 261 K/UL (ref 130–450)
PMV BLD AUTO: 9.6 FL (ref 7–12)
POTASSIUM SERPL-SCNC: 4.3 MMOL/L (ref 3.5–5)
PROT SERPL-MCNC: 6.3 G/DL (ref 6.4–8.3)
RBC # BLD AUTO: 5.15 M/UL (ref 3.8–5.8)
SODIUM SERPL-SCNC: 142 MMOL/L (ref 132–146)
WBC OTHER # BLD: 6.5 K/UL (ref 4.5–11.5)

## 2023-08-28 PROCEDURE — 80053 COMPREHEN METABOLIC PANEL: CPT

## 2023-08-28 PROCEDURE — 85025 COMPLETE CBC W/AUTO DIFF WBC: CPT

## 2023-08-28 PROCEDURE — 6360000002 HC RX W HCPCS: Performed by: STUDENT IN AN ORGANIZED HEALTH CARE EDUCATION/TRAINING PROGRAM

## 2023-08-28 PROCEDURE — 6360000002 HC RX W HCPCS: Performed by: NURSE PRACTITIONER

## 2023-08-28 PROCEDURE — 6370000000 HC RX 637 (ALT 250 FOR IP): Performed by: NURSE PRACTITIONER

## 2023-08-28 PROCEDURE — 6370000000 HC RX 637 (ALT 250 FOR IP): Performed by: STUDENT IN AN ORGANIZED HEALTH CARE EDUCATION/TRAINING PROGRAM

## 2023-08-28 PROCEDURE — 2580000003 HC RX 258: Performed by: STUDENT IN AN ORGANIZED HEALTH CARE EDUCATION/TRAINING PROGRAM

## 2023-08-28 PROCEDURE — 2580000003 HC RX 258: Performed by: NURSE PRACTITIONER

## 2023-08-28 RX ORDER — DOXYCYCLINE HYCLATE 100 MG
100 TABLET ORAL 2 TIMES DAILY
Qty: 14 TABLET | Refills: 0 | Status: SHIPPED | OUTPATIENT
Start: 2023-08-28 | End: 2023-09-04

## 2023-08-28 RX ADMIN — SODIUM CHLORIDE, PRESERVATIVE FREE 10 ML: 5 INJECTION INTRAVENOUS at 07:44

## 2023-08-28 RX ADMIN — ASPIRIN 81 MG CHEWABLE TABLET 81 MG: 81 TABLET CHEWABLE at 07:43

## 2023-08-28 RX ADMIN — BACLOFEN 10 MG: 10 TABLET ORAL at 07:44

## 2023-08-28 RX ADMIN — MELOXICAM 7.5 MG: 7.5 TABLET ORAL at 07:44

## 2023-08-28 RX ADMIN — PANTOPRAZOLE SODIUM 40 MG: 40 TABLET, DELAYED RELEASE ORAL at 06:36

## 2023-08-28 RX ADMIN — DOXYCYCLINE HYCLATE 100 MG: 100 CAPSULE ORAL at 07:43

## 2023-08-28 RX ADMIN — WATER 2000 MG: 1 INJECTION INTRAMUSCULAR; INTRAVENOUS; SUBCUTANEOUS at 15:44

## 2023-08-28 RX ADMIN — ENOXAPARIN SODIUM 30 MG: 100 INJECTION SUBCUTANEOUS at 07:44

## 2023-08-28 NOTE — PLAN OF CARE
Problem: Discharge Planning  Goal: Discharge to home or other facility with appropriate resources  8/28/2023 0002 by Archie Sepulveda RN  Outcome: Progressing  8/27/2023 1030 by Sylvia Grubbs RN  Outcome: Progressing     Problem: Pain  Goal: Verbalizes/displays adequate comfort level or baseline comfort level  Outcome: Progressing     Problem: Safety - Adult  Goal: Free from fall injury  8/28/2023 0002 by Archie Sepulveda RN  Outcome: Progressing  8/27/2023 1030 by Sylvia Grubbs RN  Outcome: Progressing no

## 2023-08-28 NOTE — CARE COORDINATION
8/28/2023  Social Work Discharge Planning: IV ATB. Insect bite on lower right ext. [facility] is following. Plan is home. Pt is independent from home with spouse. Electronically signed by ANTON Way on 8/28/2023 at 9:45 AM

## 2023-08-28 NOTE — PROGRESS NOTES
CLINICAL PHARMACY NOTE: MEDS TO BEDS    Total # of Prescriptions Filled: 1   The following medications were delivered to the patient:  Doxycycline hyclate 100 mg       Additional Documentation:

## 2023-08-28 NOTE — PLAN OF CARE
Problem: Discharge Planning  Goal: Discharge to home or other facility with appropriate resources  8/28/2023 0002 by Shayna Davies RN  Outcome: Progressing     Problem: Pain  Goal: Verbalizes/displays adequate comfort level or baseline comfort level  8/28/2023 0915 by Todd Fam RN  Outcome: Progressing  8/28/2023 0002 by Shayna Davies RN  Outcome: Progressing

## 2023-08-29 LAB
B BURGDOR AB SER IA-ACNC: 0.36 IV
MICROORGANISM SPEC CULT: NORMAL
MICROORGANISM SPEC CULT: NORMAL
SERVICE CMNT-IMP: NORMAL
SERVICE CMNT-IMP: NORMAL
SPECIMEN DESCRIPTION: NORMAL
SPECIMEN DESCRIPTION: NORMAL

## 2023-09-03 LAB
SEND OUT REPORT: NORMAL
TEST NAME: NORMAL

## 2023-10-30 ENCOUNTER — OFFICE VISIT (OUTPATIENT)
Dept: PODIATRY | Age: 58
End: 2023-10-30
Payer: COMMERCIAL

## 2023-10-30 VITALS
BODY MASS INDEX: 33.91 KG/M2 | SYSTOLIC BLOOD PRESSURE: 146 MMHG | TEMPERATURE: 98.4 F | WEIGHT: 250 LBS | DIASTOLIC BLOOD PRESSURE: 88 MMHG

## 2023-10-30 DIAGNOSIS — L60.0 INGROWN NAIL: ICD-10-CM

## 2023-10-30 DIAGNOSIS — M72.2 PLANTAR FASCIAL FIBROMATOSIS: ICD-10-CM

## 2023-10-30 DIAGNOSIS — M77.32 CALCANEAL SPUR OF BOTH FEET: Primary | ICD-10-CM

## 2023-10-30 DIAGNOSIS — M77.31 CALCANEAL SPUR OF BOTH FEET: Primary | ICD-10-CM

## 2023-10-30 DIAGNOSIS — M79.675 PAIN IN LEFT TOE(S): ICD-10-CM

## 2023-10-30 PROCEDURE — 11750 EXCISION NAIL&NAIL MATRIX: CPT | Performed by: PODIATRIST

## 2023-10-30 PROCEDURE — 99213 OFFICE O/P EST LOW 20 MIN: CPT | Performed by: PODIATRIST

## 2023-10-30 RX ORDER — NABUMETONE 500 MG/1
500 TABLET, FILM COATED ORAL 2 TIMES DAILY
Qty: 180 TABLET | Refills: 0 | Status: SHIPPED | OUTPATIENT
Start: 2023-10-30 | End: 2024-01-28

## 2023-10-30 NOTE — PROGRESS NOTES
Radha Hernandez : 1965 Sex: male  Age: 59 y.o. Patient was referred by Dot Wong MD    Chief Complaint   Patient presents with    Foot Pain     Plantar fascitis f/u needs a refill on Relafen        SUBJECTIVE patient is seen for follow-up of left heel pain. Patient at this time had 2 injections night splint icing stretching is about 60% better ingrowwn nail  right hallux  Foot Pain       Review of Systems  Const: Denies constitutional symptoms  Musculo: Denies symptoms other than stated above  Skin: Denies symptoms other than stated above       Current Outpatient Medications:     nabumetone (RELAFEN) 500 MG tablet, Take 1 tablet by mouth 2 times daily, Disp: 180 tablet, Rfl: 0    baclofen (LIORESAL) 10 MG tablet, Take 1 tablet by mouth in the morning and 1 tablet in the evening., Disp: , Rfl:     omeprazole (PRILOSEC) 20 MG delayed release capsule, take 1 capsule by mouth once daily, Disp: 90 capsule, Rfl: 0    rosuvastatin (CRESTOR) 5 MG tablet, Take 1 tablet by mouth daily, Disp: 90 tablet, Rfl: 1    testosterone cypionate (DEPOTESTOTERONE CYPIONATE) 100 MG/ML injection, Inject 1 mL into the muscle every 14 days for 180 days. , Disp: 2 mL, Rfl: 5    Omega-3 Fatty Acids (FISH OIL) 1200 MG CAPS, Take  by mouth daily. , Disp: , Rfl:     aspirin 81 MG tablet, Take 1 tablet by mouth daily, Disp: , Rfl:   No Known Allergies      Past Medical History:   Diagnosis Date    Cervical radiculopathy     Family history of prostate cancer     GERD (gastroesophageal reflux disease)     Hyperlipidemia     Impaired fasting glucose     Testosterone deficiency     TIA (transient ischemic attack)      Past Surgical History:   Procedure Laterality Date    CARDIAC VALVE REPLACEMENT      COLONOSCOPY      REFRACTIVE SURGERY      TONSILLECTOMY      VASECTOMY       Family History   Problem Relation Age of Onset    Cancer Father      Social History     Socioeconomic History    Marital status:      Spouse name:

## 2023-10-30 NOTE — PATIENT INSTRUCTIONS
Starting day #2 soak foot in 1 Tablespoon of epsom salt and warm water for 15 minutes per day until Dr Tremaine Saucedo tells you to stop soaking. Apply Neosporin or triple antibiotic ointment and a Band-Aid.      Any Questions feel free to contact Jim Holly at [phone]

## 2023-11-20 ENCOUNTER — OFFICE VISIT (OUTPATIENT)
Dept: PODIATRY | Age: 58
End: 2023-11-20
Payer: COMMERCIAL

## 2023-11-20 VITALS — TEMPERATURE: 97.2 F | WEIGHT: 250 LBS | BODY MASS INDEX: 33.91 KG/M2

## 2023-11-20 DIAGNOSIS — M72.2 PLANTAR FASCIAL FIBROMATOSIS: ICD-10-CM

## 2023-11-20 PROCEDURE — 99213 OFFICE O/P EST LOW 20 MIN: CPT | Performed by: PODIATRIST

## 2023-11-20 RX ORDER — NABUMETONE 500 MG/1
500 TABLET, FILM COATED ORAL DAILY
Qty: 90 TABLET | Refills: 0 | Status: SHIPPED | OUTPATIENT
Start: 2023-11-20 | End: 2024-02-18

## 2023-11-20 NOTE — PROGRESS NOTES
23  Tommy Kasper : 1965 Sex: male  Age: 59 y.o. SUBJECTIVE patient is seen for follow-up of an ingrown toenail. Patient is seen for follow-up of matrixectomy, patient denies fever chills or night sweats patient states that it is feeling better  HPI  Review of Systems  Const: Denies constitutional symptoms  Musculo: Denies symptoms other than stated above  Skin: Denies symptoms other than stated above       Current Outpatient Medications:     nabumetone (RELAFEN) 500 MG tablet, Take 1 tablet by mouth daily, Disp: 90 tablet, Rfl: 0    baclofen (LIORESAL) 10 MG tablet, Take 1 tablet by mouth in the morning and 1 tablet in the evening., Disp: , Rfl:     omeprazole (PRILOSEC) 20 MG delayed release capsule, take 1 capsule by mouth once daily, Disp: 90 capsule, Rfl: 0    rosuvastatin (CRESTOR) 5 MG tablet, Take 1 tablet by mouth daily, Disp: 90 tablet, Rfl: 1    Omega-3 Fatty Acids (FISH OIL) 1200 MG CAPS, Take  by mouth daily. , Disp: , Rfl:     aspirin 81 MG tablet, Take 1 tablet by mouth daily, Disp: , Rfl:     testosterone cypionate (DEPOTESTOTERONE CYPIONATE) 100 MG/ML injection, Inject 1 mL into the muscle every 14 days for 180 days. , Disp: 2 mL, Rfl: 5  No Known Allergies    Past Medical History:   Diagnosis Date    Cervical radiculopathy     Family history of prostate cancer     GERD (gastroesophageal reflux disease)     Hyperlipidemia     Impaired fasting glucose     Testosterone deficiency     TIA (transient ischemic attack)      Past Surgical History:   Procedure Laterality Date    CARDIAC VALVE REPLACEMENT      COLONOSCOPY      REFRACTIVE SURGERY      TONSILLECTOMY      VASECTOMY       Family History   Problem Relation Age of Onset    Cancer Father      Social History     Socioeconomic History    Marital status:      Spouse name: Not on file    Number of children: Not on file    Years of education: Not on file    Highest education level: Not on file   Occupational History

## 2024-03-27 ENCOUNTER — APPOINTMENT (OUTPATIENT)
Dept: CT IMAGING | Age: 59
DRG: 372 | End: 2024-03-27
Payer: COMMERCIAL

## 2024-03-27 ENCOUNTER — HOSPITAL ENCOUNTER (INPATIENT)
Age: 59
LOS: 2 days | Discharge: HOME | DRG: 372 | End: 2024-03-30
Attending: EMERGENCY MEDICINE | Admitting: HOSPITALIST
Payer: COMMERCIAL

## 2024-03-27 DIAGNOSIS — R11.2 NAUSEA AND VOMITING, UNSPECIFIED VOMITING TYPE: Primary | ICD-10-CM

## 2024-03-27 DIAGNOSIS — R19.7 DIARRHEA, UNSPECIFIED TYPE: ICD-10-CM

## 2024-03-27 DIAGNOSIS — R93.5 ABNORMAL ABDOMINAL CT SCAN: ICD-10-CM

## 2024-03-27 PROBLEM — K56.600 SMALL BOWEL OBSTRUCTION, PARTIAL (HCC): Status: ACTIVE | Noted: 2024-03-27

## 2024-03-27 PROBLEM — K56.600 PARTIAL SMALL BOWEL OBSTRUCTION (HCC): Status: ACTIVE | Noted: 2024-03-27

## 2024-03-27 LAB
ALBUMIN SERPL-MCNC: 4.2 G/DL (ref 3.5–5.2)
ALP SERPL-CCNC: 67 U/L (ref 40–129)
ALT SERPL-CCNC: 24 U/L (ref 0–40)
ANION GAP SERPL CALCULATED.3IONS-SCNC: 15 MMOL/L (ref 7–16)
AST SERPL-CCNC: 28 U/L (ref 0–39)
BASOPHILS # BLD: 0.02 K/UL (ref 0–0.2)
BASOPHILS NFR BLD: 0 % (ref 0–2)
BILIRUB SERPL-MCNC: 0.5 MG/DL (ref 0–1.2)
BILIRUB UR QL STRIP: NEGATIVE
BUN SERPL-MCNC: 19 MG/DL (ref 6–23)
CALCIUM SERPL-MCNC: 9 MG/DL (ref 8.6–10.2)
CHLORIDE SERPL-SCNC: 98 MMOL/L (ref 98–107)
CLARITY UR: CLEAR
CO2 SERPL-SCNC: 22 MMOL/L (ref 22–29)
COLOR UR: YELLOW
COMMENT: ABNORMAL
CREAT SERPL-MCNC: 1 MG/DL (ref 0.7–1.2)
EOSINOPHIL # BLD: 0 K/UL (ref 0.05–0.5)
EOSINOPHILS RELATIVE PERCENT: 0 % (ref 0–6)
ERYTHROCYTE [DISTWIDTH] IN BLOOD BY AUTOMATED COUNT: 13.6 % (ref 11.5–15)
GFR SERPL CREATININE-BSD FRML MDRD: 87 ML/MIN/1.73M2
GLUCOSE SERPL-MCNC: 122 MG/DL (ref 74–99)
GLUCOSE UR STRIP-MCNC: NEGATIVE MG/DL
HCT VFR BLD AUTO: 52.4 % (ref 37–54)
HGB BLD-MCNC: 18.2 G/DL (ref 12.5–16.5)
HGB UR QL STRIP.AUTO: NEGATIVE
IMM GRANULOCYTES # BLD AUTO: 0.05 K/UL (ref 0–0.58)
IMM GRANULOCYTES NFR BLD: 0 % (ref 0–5)
INFLUENZA A BY PCR: NOT DETECTED
INFLUENZA B BY PCR: NOT DETECTED
KETONES UR STRIP-MCNC: ABNORMAL MG/DL
LACTATE BLDV-SCNC: 1 MMOL/L (ref 0.5–1.9)
LEUKOCYTE ESTERASE UR QL STRIP: NEGATIVE
LIPASE SERPL-CCNC: 14 U/L (ref 13–60)
LYMPHOCYTES NFR BLD: 0.7 K/UL (ref 1.5–4)
LYMPHOCYTES RELATIVE PERCENT: 6 % (ref 20–42)
MCH RBC QN AUTO: 31.2 PG (ref 26–35)
MCHC RBC AUTO-ENTMCNC: 34.7 G/DL (ref 32–34.5)
MCV RBC AUTO: 89.9 FL (ref 80–99.9)
MONOCYTES NFR BLD: 0.86 K/UL (ref 0.1–0.95)
MONOCYTES NFR BLD: 7 % (ref 2–12)
NEUTROPHILS NFR BLD: 86 % (ref 43–80)
NEUTS SEG NFR BLD: 10.22 K/UL (ref 1.8–7.3)
NITRITE UR QL STRIP: NEGATIVE
PH UR STRIP: 5.5 [PH] (ref 5–9)
PLATELET # BLD AUTO: 348 K/UL (ref 130–450)
PMV BLD AUTO: 9 FL (ref 7–12)
POTASSIUM SERPL-SCNC: 3.5 MMOL/L (ref 3.5–5)
PROT SERPL-MCNC: 7.2 G/DL (ref 6.4–8.3)
PROT UR STRIP-MCNC: NEGATIVE MG/DL
RBC # BLD AUTO: 5.83 M/UL (ref 3.8–5.8)
SARS-COV-2 RDRP RESP QL NAA+PROBE: NOT DETECTED
SODIUM SERPL-SCNC: 135 MMOL/L (ref 132–146)
SP GR UR STRIP: 1.02 (ref 1–1.03)
SPECIMEN DESCRIPTION: NORMAL
UROBILINOGEN UR STRIP-ACNC: 0.2 EU/DL (ref 0–1)
WBC OTHER # BLD: 11.9 K/UL (ref 4.5–11.5)

## 2024-03-27 PROCEDURE — 83605 ASSAY OF LACTIC ACID: CPT

## 2024-03-27 PROCEDURE — 96376 TX/PRO/DX INJ SAME DRUG ADON: CPT

## 2024-03-27 PROCEDURE — 96372 THER/PROPH/DIAG INJ SC/IM: CPT

## 2024-03-27 PROCEDURE — 6360000002 HC RX W HCPCS

## 2024-03-27 PROCEDURE — 6360000004 HC RX CONTRAST MEDICATION: Performed by: RADIOLOGY

## 2024-03-27 PROCEDURE — G0378 HOSPITAL OBSERVATION PER HR: HCPCS

## 2024-03-27 PROCEDURE — 87502 INFLUENZA DNA AMP PROBE: CPT

## 2024-03-27 PROCEDURE — 96374 THER/PROPH/DIAG INJ IV PUSH: CPT

## 2024-03-27 PROCEDURE — 80053 COMPREHEN METABOLIC PANEL: CPT

## 2024-03-27 PROCEDURE — 74177 CT ABD & PELVIS W/CONTRAST: CPT

## 2024-03-27 PROCEDURE — 2580000003 HC RX 258: Performed by: EMERGENCY MEDICINE

## 2024-03-27 PROCEDURE — 96361 HYDRATE IV INFUSION ADD-ON: CPT

## 2024-03-27 PROCEDURE — 96375 TX/PRO/DX INJ NEW DRUG ADDON: CPT

## 2024-03-27 PROCEDURE — 99285 EMERGENCY DEPT VISIT HI MDM: CPT

## 2024-03-27 PROCEDURE — 6370000000 HC RX 637 (ALT 250 FOR IP)

## 2024-03-27 PROCEDURE — 85025 COMPLETE CBC W/AUTO DIFF WBC: CPT

## 2024-03-27 PROCEDURE — 81003 URINALYSIS AUTO W/O SCOPE: CPT

## 2024-03-27 PROCEDURE — 6360000002 HC RX W HCPCS: Performed by: EMERGENCY MEDICINE

## 2024-03-27 PROCEDURE — 87635 SARS-COV-2 COVID-19 AMP PRB: CPT

## 2024-03-27 PROCEDURE — 83690 ASSAY OF LIPASE: CPT

## 2024-03-27 PROCEDURE — 2580000003 HC RX 258

## 2024-03-27 RX ORDER — SODIUM CHLORIDE 0.9 % (FLUSH) 0.9 %
10 SYRINGE (ML) INJECTION PRN
Status: DISCONTINUED | OUTPATIENT
Start: 2024-03-27 | End: 2024-03-30 | Stop reason: HOSPADM

## 2024-03-27 RX ORDER — ASPIRIN 81 MG/1
81 TABLET ORAL DAILY
Status: DISCONTINUED | OUTPATIENT
Start: 2024-03-28 | End: 2024-03-30 | Stop reason: HOSPADM

## 2024-03-27 RX ORDER — ACETAMINOPHEN 650 MG/1
650 SUPPOSITORY RECTAL EVERY 6 HOURS PRN
Status: DISCONTINUED | OUTPATIENT
Start: 2024-03-27 | End: 2024-03-30 | Stop reason: HOSPADM

## 2024-03-27 RX ORDER — SODIUM CHLORIDE 9 MG/ML
INJECTION, SOLUTION INTRAVENOUS PRN
Status: DISCONTINUED | OUTPATIENT
Start: 2024-03-27 | End: 2024-03-30 | Stop reason: HOSPADM

## 2024-03-27 RX ORDER — ENOXAPARIN SODIUM 100 MG/ML
30 INJECTION SUBCUTANEOUS 2 TIMES DAILY
Status: DISCONTINUED | OUTPATIENT
Start: 2024-03-27 | End: 2024-03-30 | Stop reason: HOSPADM

## 2024-03-27 RX ORDER — BACLOFEN 10 MG/1
10 TABLET ORAL EVERY 12 HOURS
Status: DISCONTINUED | OUTPATIENT
Start: 2024-03-27 | End: 2024-03-30 | Stop reason: HOSPADM

## 2024-03-27 RX ORDER — SODIUM CHLORIDE 9 MG/ML
INJECTION, SOLUTION INTRAVENOUS CONTINUOUS
Status: ACTIVE | OUTPATIENT
Start: 2024-03-27 | End: 2024-03-29

## 2024-03-27 RX ORDER — POTASSIUM CHLORIDE 7.45 MG/ML
10 INJECTION INTRAVENOUS PRN
Status: DISCONTINUED | OUTPATIENT
Start: 2024-03-27 | End: 2024-03-30 | Stop reason: HOSPADM

## 2024-03-27 RX ORDER — ACETAMINOPHEN 325 MG/1
650 TABLET ORAL EVERY 6 HOURS PRN
Status: DISCONTINUED | OUTPATIENT
Start: 2024-03-27 | End: 2024-03-30 | Stop reason: HOSPADM

## 2024-03-27 RX ORDER — PANTOPRAZOLE SODIUM 40 MG/1
40 TABLET, DELAYED RELEASE ORAL
Status: DISCONTINUED | OUTPATIENT
Start: 2024-03-28 | End: 2024-03-29

## 2024-03-27 RX ORDER — 0.9 % SODIUM CHLORIDE 0.9 %
1000 INTRAVENOUS SOLUTION INTRAVENOUS ONCE
Status: COMPLETED | OUTPATIENT
Start: 2024-03-27 | End: 2024-03-27

## 2024-03-27 RX ORDER — ONDANSETRON 2 MG/ML
4 INJECTION INTRAMUSCULAR; INTRAVENOUS ONCE
Status: COMPLETED | OUTPATIENT
Start: 2024-03-27 | End: 2024-03-27

## 2024-03-27 RX ORDER — ONDANSETRON 2 MG/ML
4 INJECTION INTRAMUSCULAR; INTRAVENOUS EVERY 6 HOURS PRN
Status: DISCONTINUED | OUTPATIENT
Start: 2024-03-28 | End: 2024-03-30 | Stop reason: HOSPADM

## 2024-03-27 RX ORDER — POTASSIUM CHLORIDE 20 MEQ/1
40 TABLET, EXTENDED RELEASE ORAL PRN
Status: DISCONTINUED | OUTPATIENT
Start: 2024-03-27 | End: 2024-03-30 | Stop reason: HOSPADM

## 2024-03-27 RX ORDER — ROSUVASTATIN CALCIUM 5 MG/1
5 TABLET, COATED ORAL DAILY
Status: DISCONTINUED | OUTPATIENT
Start: 2024-03-28 | End: 2024-03-30 | Stop reason: HOSPADM

## 2024-03-27 RX ORDER — FENTANYL CITRATE 50 UG/ML
25 INJECTION, SOLUTION INTRAMUSCULAR; INTRAVENOUS ONCE
Status: COMPLETED | OUTPATIENT
Start: 2024-03-27 | End: 2024-03-27

## 2024-03-27 RX ORDER — FENTANYL CITRATE 50 UG/ML
25 INJECTION, SOLUTION INTRAMUSCULAR; INTRAVENOUS
Status: DISPENSED | OUTPATIENT
Start: 2024-03-27 | End: 2024-03-28

## 2024-03-27 RX ORDER — SENNOSIDES A AND B 8.6 MG/1
1 TABLET, FILM COATED ORAL DAILY PRN
Status: DISCONTINUED | OUTPATIENT
Start: 2024-03-27 | End: 2024-03-30 | Stop reason: HOSPADM

## 2024-03-27 RX ORDER — SODIUM CHLORIDE 0.9 % (FLUSH) 0.9 %
10 SYRINGE (ML) INJECTION EVERY 12 HOURS SCHEDULED
Status: DISCONTINUED | OUTPATIENT
Start: 2024-03-27 | End: 2024-03-30 | Stop reason: HOSPADM

## 2024-03-27 RX ADMIN — SODIUM CHLORIDE, PRESERVATIVE FREE 10 ML: 5 INJECTION INTRAVENOUS at 22:30

## 2024-03-27 RX ADMIN — BACLOFEN 10 MG: 10 TABLET ORAL at 22:17

## 2024-03-27 RX ADMIN — SODIUM CHLORIDE: 9 INJECTION, SOLUTION INTRAVENOUS at 22:24

## 2024-03-27 RX ADMIN — FENTANYL CITRATE 25 MCG: 50 INJECTION, SOLUTION INTRAMUSCULAR; INTRAVENOUS at 23:33

## 2024-03-27 RX ADMIN — ENOXAPARIN SODIUM 30 MG: 100 INJECTION SUBCUTANEOUS at 22:17

## 2024-03-27 RX ADMIN — ONDANSETRON 4 MG: 2 INJECTION INTRAMUSCULAR; INTRAVENOUS at 18:26

## 2024-03-27 RX ADMIN — FENTANYL CITRATE 25 MCG: 50 INJECTION INTRAMUSCULAR; INTRAVENOUS at 18:25

## 2024-03-27 RX ADMIN — HYDROMORPHONE HYDROCHLORIDE 0.5 MG: 1 INJECTION, SOLUTION INTRAMUSCULAR; INTRAVENOUS; SUBCUTANEOUS at 19:59

## 2024-03-27 RX ADMIN — IOPAMIDOL 75 ML: 755 INJECTION, SOLUTION INTRAVENOUS at 19:37

## 2024-03-27 RX ADMIN — SODIUM CHLORIDE 1000 ML: 9 INJECTION, SOLUTION INTRAVENOUS at 18:25

## 2024-03-27 ASSESSMENT — PAIN SCALES - GENERAL
PAINLEVEL_OUTOF10: 9
PAINLEVEL_OUTOF10: 8
PAINLEVEL_OUTOF10: 5
PAINLEVEL_OUTOF10: 9
PAINLEVEL_OUTOF10: 9
PAINLEVEL_OUTOF10: 2

## 2024-03-27 ASSESSMENT — LIFESTYLE VARIABLES
HOW MANY STANDARD DRINKS CONTAINING ALCOHOL DO YOU HAVE ON A TYPICAL DAY: PATIENT DOES NOT DRINK
HOW OFTEN DO YOU HAVE A DRINK CONTAINING ALCOHOL: NEVER

## 2024-03-27 ASSESSMENT — PAIN DESCRIPTION - LOCATION
LOCATION: ABDOMEN

## 2024-03-27 ASSESSMENT — PAIN DESCRIPTION - DESCRIPTORS
DESCRIPTORS: CRAMPING
DESCRIPTORS: SHARP
DESCRIPTORS: CRAMPING

## 2024-03-27 ASSESSMENT — PAIN DESCRIPTION - ORIENTATION: ORIENTATION: LEFT;MID

## 2024-03-27 ASSESSMENT — PAIN - FUNCTIONAL ASSESSMENT
PAIN_FUNCTIONAL_ASSESSMENT: ACTIVITIES ARE NOT PREVENTED
PAIN_FUNCTIONAL_ASSESSMENT: 0-10

## 2024-03-27 NOTE — ED TRIAGE NOTES
Department of Emergency Medicine    FIRST PROVIDER TRIAGE NOTE             Independent MLP           3/27/24  5:07 PM EDT    Date of Encounter: 3/27/24   MRN: [id number]    Vitals:    03/27/24 1709   BP: (!) 125/107   Pulse: (!) 103   Resp: 16   Temp: 99.4 °F (37.4 °C)   TempSrc: Oral   SpO2: 98%   Weight: 108 kg (238 lb)      HPI: Cameron Smith is a 58 y.o. male who presents to the ED for Abdominal Pain (Generalized abd pain, nausea, diarrhea x3 days)     Generalized abd pain with diarrhea   Denies hx diverticulitis     Denies history of abdominal surgeries     ROS: Negative for cp or sob.    Physical Exam:   Gen Appearance/Constitutional: alert  CV: tachycardia- mild at 104     Initial Plan of Care: All treatment areas with department are currently occupied.     Plan to order/Initiate the following while awaiting opening in ED: Triage evaluation  labs.    Initial Plan of Care: Initiate Treatment-Testing, Proceed toTreatment Area When Bed Available for ED Attending/MLP to Continue Care    Electronically signed by Sujatha Crenshaw PA-C   DD: 3/27/24

## 2024-03-27 NOTE — ED PROVIDER NOTES
[facility] MED SURG/TELE  EMERGENCY DEPARTMENT ENCOUNTER        Pt Name: Cameron Smith  MRN: [id number]  Birthdate 1965  Date of evaluation: 3/27/2024  Provider: Sue Mccann MD  PCP: Twila Barnett MD  Note Started: 6:09 PM EDT 3/27/24    CHIEF COMPLAINT       Chief Complaint   Patient presents with    Abdominal Pain     Generalized abd pain, nausea, diarrhea x3 days       HISTORY OF PRESENT ILLNESS: 1 or more Elements   History From:  patient    Limitations to history : None    Cameron Smith is a 58 y.o. male who presents to the emergency department with complaints of nausea vomiting and diarrhea.  Patient states symptoms began about 3 days ago he vomited once and had diarrhea.  No blood in his vomit or stool.  Vomiting is resolved the diarrhea persists.  Has been having diarrhea about 5-6 times a day.  No fevers or chills reports diffuse cramping abdominal pain.  No flank pain no urinary complaints no chest pain or shortness of breath no cough.  He does have children who had similar symptoms grandchild but also had similar symptoms earlier in the week but their symptoms resolved.  No prior abdominal surgeries.    Nursing Notes were all reviewed and agreed with or any disagreements were addressed in the HPI.      REVIEW OF EXTERNAL NOTE :       PDMP reviewed    REVIEW OF SYSTEMS :           Positives and Pertinent negatives as per HPI.     SURGICAL HISTORY     Past Surgical History:   Procedure Laterality Date    CARDIAC VALVE REPLACEMENT      COLONOSCOPY      REFRACTIVE SURGERY      TONSILLECTOMY      VASECTOMY         CURRENTMEDICATIONS       Current Discharge Medication List        CONTINUE these medications which have NOT CHANGED    Details   nabumetone (RELAFEN) 500 MG tablet Take 1 tablet by mouth daily  Qty: 90 tablet, Refills: 0    Associated Diagnoses: Plantar fascial fibromatosis      baclofen (LIORESAL) 10 MG tablet Take 1 tablet by mouth in the morning and 1 tablet in the evening.     a day.  No fevers or chills reports diffuse cramping abdominal pain.  No flank pain no urinary complaints     Physical exam findings: Mild diffuse lower abdominal tenderness mild tachycardia 103.  Patient without any distress no George sign.  No McBurney's point tenderness.    Differential diagnosis (includes but not limited to):  Nausea vomiting diarrhea COVID influenza diverticulitis dehydration SKYLAR gastroenteritis      Chronic conditions:  has a past medical history of Cervical radiculopathy, Family history of prostate cancer, GERD (gastroesophageal reflux disease), Hyperlipidemia, Impaired fasting glucose, Testosterone deficiency, and TIA (transient ischemic attack).          ED Course Summary:(labs and imaging reviewed, interventions, reassessment, consults,shared decision making with patient, disposition)    CBC is ordered to evaluate for any signs of infection or inflammation by obtaining a WBC count, or any signs of acute anemia by interpreting hemoglobin. CMP for any electrolyte imbalances, kidney function, hepatic injury or any elevations in anion gap. Urinalysis to evaluate for a UTI. Lipase to evaluate for pancreatitis. Lactic acid as a marker of hypoperfusion or ischemia. CT abdomen for, but without limitation to, ureterolithiasis, nephrolithiasis, constipation, hollow organ perforation, small bowel obstruction, bowel ischemia, pneumoperitoneum, diverticulitis, cholecystitis, appendicitis, intra-abdominal abscess, or malignancy.      Patient was initially given a liter of IV fluids 4 mg of IV Zofran and 25 mcg of IV fentanyl.  Pain was not well-controlled she was given half a milligram of IV Dilaudid which did improve pain.  Urinalysis wasNegative for acute infection chemistry was normal creatinine was normal LFTs were normal CBC showed slight elevated white count at 11.9.  Lipase and lactic acid was normal.  Stool studies were sent and  Including for C. difficile.  COVID and flu swabs were negative.

## 2024-03-28 ENCOUNTER — APPOINTMENT (OUTPATIENT)
Dept: GENERAL RADIOLOGY | Age: 59
DRG: 372 | End: 2024-03-28
Payer: COMMERCIAL

## 2024-03-28 PROBLEM — K56.609 SMALL BOWEL OBSTRUCTION (HCC): Status: ACTIVE | Noted: 2024-03-28

## 2024-03-28 LAB
ALBUMIN SERPL-MCNC: 3.9 G/DL (ref 3.5–5.2)
ALP SERPL-CCNC: 60 U/L (ref 40–129)
ALT SERPL-CCNC: 21 U/L (ref 0–40)
ANION GAP SERPL CALCULATED.3IONS-SCNC: 12 MMOL/L (ref 7–16)
AST SERPL-CCNC: 24 U/L (ref 0–39)
BASOPHILS # BLD: 0.02 K/UL (ref 0–0.2)
BASOPHILS NFR BLD: 0 % (ref 0–2)
BILIRUB SERPL-MCNC: 0.4 MG/DL (ref 0–1.2)
BUN SERPL-MCNC: 19 MG/DL (ref 6–23)
C DIFF GDH + TOXINS A+B STL QL IA.RAPID: ABNORMAL
C DIFFICILE TOXINS, PCR: POSITIVE
CALCIUM SERPL-MCNC: 8.5 MG/DL (ref 8.6–10.2)
CHLORIDE SERPL-SCNC: 100 MMOL/L (ref 98–107)
CO2 SERPL-SCNC: 24 MMOL/L (ref 22–29)
CREAT SERPL-MCNC: 0.9 MG/DL (ref 0.7–1.2)
EOSINOPHIL # BLD: 0 K/UL (ref 0.05–0.5)
EOSINOPHILS RELATIVE PERCENT: 0 % (ref 0–6)
ERYTHROCYTE [DISTWIDTH] IN BLOOD BY AUTOMATED COUNT: 13.9 % (ref 11.5–15)
GFR SERPL CREATININE-BSD FRML MDRD: >90 ML/MIN/1.73M2
GLUCOSE SERPL-MCNC: 123 MG/DL (ref 74–99)
HCT VFR BLD AUTO: 48.9 % (ref 37–54)
HGB BLD-MCNC: 16.3 G/DL (ref 12.5–16.5)
IMM GRANULOCYTES # BLD AUTO: 0.05 K/UL (ref 0–0.58)
IMM GRANULOCYTES NFR BLD: 1 % (ref 0–5)
LYMPHOCYTES NFR BLD: 0.82 K/UL (ref 1.5–4)
LYMPHOCYTES RELATIVE PERCENT: 7 % (ref 20–42)
MCH RBC QN AUTO: 30.2 PG (ref 26–35)
MCHC RBC AUTO-ENTMCNC: 33.3 G/DL (ref 32–34.5)
MCV RBC AUTO: 90.7 FL (ref 80–99.9)
MONOCYTES NFR BLD: 0.97 K/UL (ref 0.1–0.95)
MONOCYTES NFR BLD: 9 % (ref 2–12)
NEUTROPHILS NFR BLD: 83 % (ref 43–80)
NEUTS SEG NFR BLD: 9.18 K/UL (ref 1.8–7.3)
PLATELET # BLD AUTO: 340 K/UL (ref 130–450)
PMV BLD AUTO: 9 FL (ref 7–12)
POTASSIUM SERPL-SCNC: 3.7 MMOL/L (ref 3.5–5)
PROT SERPL-MCNC: 6.7 G/DL (ref 6.4–8.3)
RBC # BLD AUTO: 5.39 M/UL (ref 3.8–5.8)
SODIUM SERPL-SCNC: 136 MMOL/L (ref 132–146)
SPECIMEN DESCRIPTION: ABNORMAL
SPECIMEN DESCRIPTION: ABNORMAL
WBC OTHER # BLD: 11 K/UL (ref 4.5–11.5)

## 2024-03-28 PROCEDURE — 6360000002 HC RX W HCPCS

## 2024-03-28 PROCEDURE — 87493 C DIFF AMPLIFIED PROBE: CPT

## 2024-03-28 PROCEDURE — 6360000002 HC RX W HCPCS: Performed by: HOSPITALIST

## 2024-03-28 PROCEDURE — 74018 RADEX ABDOMEN 1 VIEW: CPT

## 2024-03-28 PROCEDURE — G0378 HOSPITAL OBSERVATION PER HR: HCPCS

## 2024-03-28 PROCEDURE — 87449 NOS EACH ORGANISM AG IA: CPT

## 2024-03-28 PROCEDURE — 6370000000 HC RX 637 (ALT 250 FOR IP)

## 2024-03-28 PROCEDURE — 96361 HYDRATE IV INFUSION ADD-ON: CPT

## 2024-03-28 PROCEDURE — 80053 COMPREHEN METABOLIC PANEL: CPT

## 2024-03-28 PROCEDURE — 85025 COMPLETE CBC W/AUTO DIFF WBC: CPT

## 2024-03-28 PROCEDURE — 87324 CLOSTRIDIUM AG IA: CPT

## 2024-03-28 PROCEDURE — 96372 THER/PROPH/DIAG INJ SC/IM: CPT

## 2024-03-28 PROCEDURE — 1200000000 HC SEMI PRIVATE

## 2024-03-28 PROCEDURE — 2580000003 HC RX 258

## 2024-03-28 PROCEDURE — 96376 TX/PRO/DX INJ SAME DRUG ADON: CPT

## 2024-03-28 PROCEDURE — 36415 COLL VENOUS BLD VENIPUNCTURE: CPT

## 2024-03-28 RX ORDER — OXYCODONE HYDROCHLORIDE AND ACETAMINOPHEN 5; 325 MG/1; MG/1
1 TABLET ORAL ONCE
Status: COMPLETED | OUTPATIENT
Start: 2024-03-28 | End: 2024-03-28

## 2024-03-28 RX ORDER — DICYCLOMINE HYDROCHLORIDE 10 MG/ML
20 INJECTION INTRAMUSCULAR 4 TIMES DAILY
Status: DISCONTINUED | OUTPATIENT
Start: 2024-03-28 | End: 2024-03-29

## 2024-03-28 RX ADMIN — PANTOPRAZOLE SODIUM 40 MG: 40 TABLET, DELAYED RELEASE ORAL at 06:16

## 2024-03-28 RX ADMIN — FENTANYL CITRATE 25 MCG: 50 INJECTION, SOLUTION INTRAMUSCULAR; INTRAVENOUS at 13:09

## 2024-03-28 RX ADMIN — ENOXAPARIN SODIUM 30 MG: 100 INJECTION SUBCUTANEOUS at 07:45

## 2024-03-28 RX ADMIN — FENTANYL CITRATE 25 MCG: 50 INJECTION, SOLUTION INTRAMUSCULAR; INTRAVENOUS at 17:28

## 2024-03-28 RX ADMIN — SODIUM CHLORIDE, PRESERVATIVE FREE 10 ML: 5 INJECTION INTRAVENOUS at 07:46

## 2024-03-28 RX ADMIN — ENOXAPARIN SODIUM 30 MG: 100 INJECTION SUBCUTANEOUS at 22:10

## 2024-03-28 RX ADMIN — FENTANYL CITRATE 25 MCG: 50 INJECTION, SOLUTION INTRAMUSCULAR; INTRAVENOUS at 06:44

## 2024-03-28 RX ADMIN — HYDROMORPHONE HYDROCHLORIDE 0.25 MG: 1 INJECTION, SOLUTION INTRAMUSCULAR; INTRAVENOUS; SUBCUTANEOUS at 00:35

## 2024-03-28 RX ADMIN — FENTANYL CITRATE 25 MCG: 50 INJECTION, SOLUTION INTRAMUSCULAR; INTRAVENOUS at 02:15

## 2024-03-28 RX ADMIN — DICYCLOMINE HYDROCHLORIDE 20 MG: 10 INJECTION, SOLUTION INTRAMUSCULAR at 17:32

## 2024-03-28 RX ADMIN — SODIUM CHLORIDE: 9 INJECTION, SOLUTION INTRAVENOUS at 13:12

## 2024-03-28 RX ADMIN — DICYCLOMINE HYDROCHLORIDE 20 MG: 10 INJECTION, SOLUTION INTRAMUSCULAR at 13:13

## 2024-03-28 RX ADMIN — DICYCLOMINE HYDROCHLORIDE 20 MG: 10 INJECTION, SOLUTION INTRAMUSCULAR at 22:10

## 2024-03-28 RX ADMIN — ROSUVASTATIN CALCIUM 5 MG: 5 TABLET, FILM COATED ORAL at 07:45

## 2024-03-28 RX ADMIN — DICYCLOMINE HYDROCHLORIDE 20 MG: 10 INJECTION, SOLUTION INTRAMUSCULAR at 09:25

## 2024-03-28 RX ADMIN — ASPIRIN 81 MG: 81 TABLET, COATED ORAL at 07:45

## 2024-03-28 RX ADMIN — OXYCODONE AND ACETAMINOPHEN 1 TABLET: 5; 325 TABLET ORAL at 03:39

## 2024-03-28 RX ADMIN — FENTANYL CITRATE 25 MCG: 50 INJECTION, SOLUTION INTRAMUSCULAR; INTRAVENOUS at 10:25

## 2024-03-28 RX ADMIN — BACLOFEN 10 MG: 10 TABLET ORAL at 07:46

## 2024-03-28 ASSESSMENT — PAIN DESCRIPTION - DESCRIPTORS
DESCRIPTORS: CRAMPING

## 2024-03-28 ASSESSMENT — PAIN SCALES - GENERAL
PAINLEVEL_OUTOF10: 0
PAINLEVEL_OUTOF10: 0
PAINLEVEL_OUTOF10: 8
PAINLEVEL_OUTOF10: 8
PAINLEVEL_OUTOF10: 7
PAINLEVEL_OUTOF10: 3
PAINLEVEL_OUTOF10: 7

## 2024-03-28 ASSESSMENT — PAIN DESCRIPTION - LOCATION
LOCATION: ABDOMEN

## 2024-03-28 ASSESSMENT — ENCOUNTER SYMPTOMS
NAUSEA: 1
VOMITING: 1
ABDOMINAL PAIN: 1

## 2024-03-28 ASSESSMENT — PAIN - FUNCTIONAL ASSESSMENT
PAIN_FUNCTIONAL_ASSESSMENT: ACTIVITIES ARE NOT PREVENTED

## 2024-03-28 ASSESSMENT — PAIN DESCRIPTION - ORIENTATION: ORIENTATION: LEFT

## 2024-03-28 ASSESSMENT — PAIN SCALES - WONG BAKER
WONGBAKER_NUMERICALRESPONSE: NO HURT
WONGBAKER_NUMERICALRESPONSE: NO HURT

## 2024-03-28 NOTE — CARE COORDINATION
3/28/2024  Social Work Discharge Planning: CDIFF-iso. SBO-surgery consulted . Ab pain.This worker discussed discharge planning with Pt. Pt is independent from home with his spouse and uses no DME.  Room air. Pts spouse or child will transport Pt home at discharge. Pharmacy is [facility] in [place] and PCP is Dr.L. Barnett. Electronically signed by ANTON Carranza on 3/28/2024 at 1:13 PM

## 2024-03-28 NOTE — CARE COORDINATION
Internal Medicine On-call Care Coordination Note    I was called by the ED physician because they recommended admission for this patient and we cover their PCP.  The history as I understand it after discussion with the ED physician is as follows:    Presents with abd pain with nausea and diarrhea x 3d  CT a/p showing numerous loops of moderately dilated small bowel with possible partial small bowel obstruction, or ileus versus gastroenteritis  Given IVF, antiemetics and pain medication in ED  Decision made to admit for further evaluation and observation    I placed admission orders.  Including:    Observation  PRN antiemetics and pain medication  IVF    Dr. Argueta or his coverage will see the patient tomorrow for H&P and review all orders.    Electronically signed by CORIE Rutledge CNP on 3/27/2024 at 9:30 PM

## 2024-03-28 NOTE — CONSULTS
GENERAL SURGERY  CONSULT NOTE    Patient's Name/Date of Birth: Cameron Smith / 1965    Date: March 28, 2024     PCP: Twila Barnett MD     Chief Complaint:   Chief Complaint   Patient presents with    Abdominal Pain     Generalized abd pain, nausea, diarrhea x3 days       Physician Consulted: Dr. Santamaria  Reason for Consult: Bowel obstruction  Referring Physician: Dr. Raoul GROSSMAN  Cameron Smith is a 58 y.o. male who presents for evaluation of bowel obstruction. Patient states his family has been getting over the flu recently and he has been having nausea, vomiting and diarrhea. He then developed some epigastric pain. States its constant in nature. Does not radiate. Nothing makes it worse, nothing makes it better. Has not had anything like this happen before. Reports he is still having bowel movements and passing flatus. NG tube was placed w/ 200 cc gastric content in canister. Take daily ASA for hx of TIA. No prior abdominal surgical history. Per patient last Colonoscopy 3 years ago at [place] with polyps.       Past Medical History:   Diagnosis Date    Cervical radiculopathy     Family history of prostate cancer     GERD (gastroesophageal reflux disease)     Hyperlipidemia     Impaired fasting glucose     Testosterone deficiency     TIA (transient ischemic attack) 2011       Past Surgical History:   Procedure Laterality Date    CARDIAC VALVE REPLACEMENT      COLONOSCOPY      REFRACTIVE SURGERY      TONSILLECTOMY      VASECTOMY         Medications Prior to Admission:    Prior to Admission medications    Medication Sig Start Date End Date Taking? Authorizing Provider   nabumetone (RELAFEN) 500 MG tablet Take 1 tablet by mouth daily 11/20/23 2/18/24  Nacho Charles DPM   baclofen (LIORESAL) 10 MG tablet Take 1 tablet by mouth in the morning and 1 tablet in the evening. 8/21/23   Provider, MD Jeana   omeprazole (PRILOSEC) 20 MG delayed release capsule take 1 capsule by mouth once daily 8/4/20   Chandu Phillip,  radiculopathy    TIA (transient ischemic attack)    Hyperlipidemia    GERD (gastroesophageal reflux disease)    Impaired fasting glucose    Family history of prostate cancer    Testosterone deficiency    Cellulitis    Small bowel obstruction, partial (HCC)    Partial small bowel obstruction (HCC)       58 y.o. male with abdominal pain and imaging  with Ileus vs Gastroenteritis. Patient abdominal exam is reassuring and having bowel function    PLAN:    - Cont NG tube decompression  - IVF  - Pain and Nausea PRN  - Cont to monitor abdominal exam    Plan will be discussed with Dr. Rosalio Agustin DO  General Surgery Resident, PGY-1    Electronically signed by Celso Agustin DO on 3/28/24 at 1:53 PM EDT     The patient was seen and examined and the chart was reviewed.  I agree with the assessment and plan.  Likely, the patient has gastroenteritis.  Also, the patient was diagnosed with C. difficile colitis.  The patient will continue to increase his diet as he tolerates.  The patient will continue appropriate management for the C. difficile colitis.  Sandip Santamaria MD

## 2024-03-28 NOTE — PLAN OF CARE
Problem: Discharge Planning  Goal: Discharge to home or other facility with appropriate resources  3/28/2024 1039 by Nu Sinclair, RN  Outcome: Progressing  3/28/2024 0138 by Nori Denis RN  Outcome: Progressing  3/27/2024 2346 by Nori Denis RN  Outcome: Progressing     Problem: Pain  Goal: Verbalizes/displays adequate comfort level or baseline comfort level  3/28/2024 1039 by Nu Sinclair, RN  Outcome: Progressing  3/28/2024 0138 by Nori Denis RN  Outcome: Progressing  3/27/2024 2346 by Nori Denis RN  Outcome: Progressing

## 2024-03-28 NOTE — ED NOTES
ED to Inpatient Handoff Report    Notified floor that electronic handoff available and patient ready for transport to room 537.    Safety Risks: None identified    Patient in Restraints: no    Constant Observer or Patient : no    Telemetry Monitoring Ordered: No          Order to transfer to unit without monitor: YES    Last MEWS: 1 Time completed: 2125    Deterioration Index: 22.18    Vitals:    03/27/24 1709 03/27/24 1734 03/27/24 2125   BP: (!) 125/107  (!) 164/86   Pulse: (!) 103  88   Resp: 16  17   Temp: 99.4 °F (37.4 °C)  98.9 °F (37.2 °C)   TempSrc: Oral  Oral   SpO2: 98%  93%   Weight: 108 kg (238 lb) 106.6 kg (235 lb)    Height:  1.829 m (6')        Opportunity for questions and clarification was provided.

## 2024-03-28 NOTE — PLAN OF CARE
Problem: Discharge Planning  Goal: Discharge to home or other facility with appropriate resources  3/28/2024 0138 by Nori Denis, RN  Outcome: Progressing  3/27/2024 2346 by Nori Denis, RN  Outcome: Progressing     Problem: Pain  Goal: Verbalizes/displays adequate comfort level or baseline comfort level  3/28/2024 0138 by Nori Denis, RN  Outcome: Progressing  3/27/2024 2346 by Nori Denis, RN  Outcome: Progressing

## 2024-03-28 NOTE — PROGRESS NOTES
4 Eyes Skin Assessment     NAME:  Cameron Smith  YOB: 1965  MEDICAL RECORD NUMBER:  [id number]    The patient is being assessed for  Admission    I agree that at least one RN has performed a thorough Head to Toe Skin Assessment on the patient. ALL assessment sites listed below have been assessed.      Areas assessed by both nurses:head to toe assessment            Does the Patient have a Wound? No noted wound(s)       Vinicio Prevention initiated by RN: No  Wound Care Orders initiated by RN: No    Pressure Injury (Stage 3,4, Unstageable, DTI, NWPT, and Complex wounds) if present, place Wound referral order by RN under : No    New Ostomies, if present place, Ostomy referral order under : No     Nurse 1 eSignature: Electronically signed by Nori Denis RN on 3/28/24 at 12:12 AM EDT    **SHARE this note so that the co-signing nurse can place an eSignature**    Nurse 2 eSignature: Electronically signed by Glenys Corral on 3/28/24 at 12:12 AM EDT

## 2024-03-28 NOTE — H&P
Internal Medicine History & Physical     Name: Cameron Smith  : 1965  Chief Complaint: Abdominal Pain (Generalized abd pain, nausea, diarrhea x3 days)  Primary Care Physician: Twila Barnett MD  Admission date: 3/27/2024  Date of service: 3/28/2024     History of Present Illness  Cameron is a 58 y.o. year old male.  Patient is coming in with increasing abdominal pain, nausea, diarrhea for 3 or 4 days starting on Monday.  Patient states there have been multiple sick family members at home with similar symptoms.  Patient has not been able to keep down anything over the last 24 to 48 hours.  Patient was having continued abdominal pain and an NG tube was placed overnight.  Following placement of the NG tube with confirmation of placement, patient began having improvement of abdominal symptoms.  Patient is now requesting to have removal of the NG tube.  Patient is still not passing flatus.  Family present at bedside.  Nothing was making her feel better at home.  Continue to worsen.      ED course:   Initial blood work and imaging studies performed. Admission recommended by ED physician. Case was discussed with ED provider. Meds in ED consisted of the following:  Medications   fentaNYL (SUBLIMAZE) injection 25 mcg (25 mcg IntraVENous Given 3/28/24 1728)   ondansetron (ZOFRAN) injection 4 mg (has no administration in time range)   aspirin EC tablet 81 mg (81 mg Oral Given 3/28/24 0745)   baclofen (LIORESAL) tablet 10 mg (10 mg Oral Given 3/28/24 0746)   pantoprazole (PROTONIX) tablet 40 mg (40 mg Oral Given 3/28/24 0616)   rosuvastatin (CRESTOR) tablet 5 mg (5 mg Oral Given 3/28/24 0745)   sodium chloride flush 0.9 % injection 10 mL (10 mLs IntraVENous Given 3/28/24 0746)   sodium chloride flush 0.9 % injection 10 mL (has no administration in time range)   0.9 % sodium chloride infusion (has no administration in time range)   potassium chloride (KLOR-CON M) extended release tablet 40 mEq (has no administration in

## 2024-03-28 NOTE — PROGRESS NOTES
Surgery consulted completed through the answering service and surgical resident notified.  Nuno Hodges 3/28/2024 9:03 AM

## 2024-03-29 LAB
ALBUMIN SERPL-MCNC: 3.6 G/DL (ref 3.5–5.2)
ALP SERPL-CCNC: 51 U/L (ref 40–129)
ALT SERPL-CCNC: 17 U/L (ref 0–40)
ANION GAP SERPL CALCULATED.3IONS-SCNC: 12 MMOL/L (ref 7–16)
AST SERPL-CCNC: 21 U/L (ref 0–39)
BASOPHILS # BLD: 0.03 K/UL (ref 0–0.2)
BASOPHILS NFR BLD: 0 % (ref 0–2)
BILIRUB SERPL-MCNC: 0.4 MG/DL (ref 0–1.2)
BUN SERPL-MCNC: 18 MG/DL (ref 6–23)
CALCIUM SERPL-MCNC: 8.1 MG/DL (ref 8.6–10.2)
CHLORIDE SERPL-SCNC: 102 MMOL/L (ref 98–107)
CO2 SERPL-SCNC: 25 MMOL/L (ref 22–29)
CREAT SERPL-MCNC: 0.8 MG/DL (ref 0.7–1.2)
EOSINOPHIL # BLD: 0.02 K/UL (ref 0.05–0.5)
EOSINOPHILS RELATIVE PERCENT: 0 % (ref 0–6)
ERYTHROCYTE [DISTWIDTH] IN BLOOD BY AUTOMATED COUNT: 14 % (ref 11.5–15)
GFR SERPL CREATININE-BSD FRML MDRD: >90 ML/MIN/1.73M2
GLUCOSE SERPL-MCNC: 86 MG/DL (ref 74–99)
HCT VFR BLD AUTO: 45.6 % (ref 37–54)
HGB BLD-MCNC: 15.2 G/DL (ref 12.5–16.5)
IMM GRANULOCYTES # BLD AUTO: 0.03 K/UL (ref 0–0.58)
IMM GRANULOCYTES NFR BLD: 0 % (ref 0–5)
LYMPHOCYTES NFR BLD: 1.24 K/UL (ref 1.5–4)
LYMPHOCYTES RELATIVE PERCENT: 15 % (ref 20–42)
MAGNESIUM SERPL-MCNC: 2.2 MG/DL (ref 1.6–2.6)
MCH RBC QN AUTO: 30.4 PG (ref 26–35)
MCHC RBC AUTO-ENTMCNC: 33.3 G/DL (ref 32–34.5)
MCV RBC AUTO: 91.2 FL (ref 80–99.9)
MONOCYTES NFR BLD: 0.65 K/UL (ref 0.1–0.95)
MONOCYTES NFR BLD: 8 % (ref 2–12)
NEUTROPHILS NFR BLD: 76 % (ref 43–80)
NEUTS SEG NFR BLD: 6.09 K/UL (ref 1.8–7.3)
PLATELET # BLD AUTO: 312 K/UL (ref 130–450)
PMV BLD AUTO: 9.2 FL (ref 7–12)
POTASSIUM SERPL-SCNC: 3.5 MMOL/L (ref 3.5–5)
PROT SERPL-MCNC: 5.7 G/DL (ref 6.4–8.3)
RBC # BLD AUTO: 5 M/UL (ref 3.8–5.8)
SODIUM SERPL-SCNC: 139 MMOL/L (ref 132–146)
WBC OTHER # BLD: 8.1 K/UL (ref 4.5–11.5)

## 2024-03-29 PROCEDURE — 2580000003 HC RX 258

## 2024-03-29 PROCEDURE — 6370000000 HC RX 637 (ALT 250 FOR IP): Performed by: HOSPITALIST

## 2024-03-29 PROCEDURE — 6370000000 HC RX 637 (ALT 250 FOR IP): Performed by: NURSE PRACTITIONER

## 2024-03-29 PROCEDURE — 6360000002 HC RX W HCPCS: Performed by: HOSPITALIST

## 2024-03-29 PROCEDURE — 6360000002 HC RX W HCPCS

## 2024-03-29 PROCEDURE — 6370000000 HC RX 637 (ALT 250 FOR IP)

## 2024-03-29 PROCEDURE — 1200000000 HC SEMI PRIVATE

## 2024-03-29 PROCEDURE — 80053 COMPREHEN METABOLIC PANEL: CPT

## 2024-03-29 PROCEDURE — 85025 COMPLETE CBC W/AUTO DIFF WBC: CPT

## 2024-03-29 PROCEDURE — 83735 ASSAY OF MAGNESIUM: CPT

## 2024-03-29 RX ORDER — FAMOTIDINE 20 MG/1
20 TABLET, FILM COATED ORAL 2 TIMES DAILY
Status: DISCONTINUED | OUTPATIENT
Start: 2024-03-29 | End: 2024-03-30 | Stop reason: HOSPADM

## 2024-03-29 RX ORDER — DICYCLOMINE HYDROCHLORIDE 10 MG/1
20 CAPSULE ORAL
Status: DISCONTINUED | OUTPATIENT
Start: 2024-03-29 | End: 2024-03-30 | Stop reason: HOSPADM

## 2024-03-29 RX ORDER — PANTOPRAZOLE SODIUM 40 MG/1
40 TABLET, DELAYED RELEASE ORAL
Status: DISCONTINUED | OUTPATIENT
Start: 2024-03-29 | End: 2024-03-29

## 2024-03-29 RX ADMIN — Medication 250 MG: at 16:48

## 2024-03-29 RX ADMIN — SODIUM CHLORIDE, PRESERVATIVE FREE 10 ML: 5 INJECTION INTRAVENOUS at 21:25

## 2024-03-29 RX ADMIN — FAMOTIDINE 20 MG: 20 TABLET ORAL at 09:53

## 2024-03-29 RX ADMIN — ENOXAPARIN SODIUM 30 MG: 100 INJECTION SUBCUTANEOUS at 08:24

## 2024-03-29 RX ADMIN — SODIUM CHLORIDE, PRESERVATIVE FREE 10 ML: 5 INJECTION INTRAVENOUS at 08:25

## 2024-03-29 RX ADMIN — BACLOFEN 10 MG: 10 TABLET ORAL at 21:24

## 2024-03-29 RX ADMIN — DICYCLOMINE HYDROCHLORIDE 20 MG: 10 INJECTION, SOLUTION INTRAMUSCULAR at 13:02

## 2024-03-29 RX ADMIN — ONDANSETRON 4 MG: 2 INJECTION INTRAMUSCULAR; INTRAVENOUS at 16:49

## 2024-03-29 RX ADMIN — Medication 250 MG: at 23:30

## 2024-03-29 RX ADMIN — BACLOFEN 10 MG: 10 TABLET ORAL at 08:25

## 2024-03-29 RX ADMIN — ASPIRIN 81 MG: 81 TABLET, COATED ORAL at 08:25

## 2024-03-29 RX ADMIN — ROSUVASTATIN CALCIUM 5 MG: 5 TABLET, FILM COATED ORAL at 08:25

## 2024-03-29 RX ADMIN — DICYCLOMINE HYDROCHLORIDE 20 MG: 10 INJECTION, SOLUTION INTRAMUSCULAR at 08:25

## 2024-03-29 RX ADMIN — ENOXAPARIN SODIUM 30 MG: 100 INJECTION SUBCUTANEOUS at 21:24

## 2024-03-29 RX ADMIN — PANTOPRAZOLE SODIUM 40 MG: 40 TABLET, DELAYED RELEASE ORAL at 08:25

## 2024-03-29 RX ADMIN — DICYCLOMINE HYDROCHLORIDE 20 MG: 10 CAPSULE ORAL at 16:48

## 2024-03-29 RX ADMIN — DICYCLOMINE HYDROCHLORIDE 20 MG: 10 CAPSULE ORAL at 21:24

## 2024-03-29 RX ADMIN — Medication 250 MG: at 11:51

## 2024-03-29 RX ADMIN — FAMOTIDINE 20 MG: 20 TABLET ORAL at 21:24

## 2024-03-29 ASSESSMENT — PAIN DESCRIPTION - LOCATION
LOCATION: ABDOMEN
LOCATION: ABDOMEN

## 2024-03-29 ASSESSMENT — PAIN DESCRIPTION - DESCRIPTORS: DESCRIPTORS: CRAMPING

## 2024-03-29 ASSESSMENT — PAIN - FUNCTIONAL ASSESSMENT: PAIN_FUNCTIONAL_ASSESSMENT: ACTIVITIES ARE NOT PREVENTED

## 2024-03-29 ASSESSMENT — PAIN SCALES - GENERAL
PAINLEVEL_OUTOF10: 0
PAINLEVEL_OUTOF10: 0

## 2024-03-29 ASSESSMENT — PAIN SCALES - WONG BAKER: WONGBAKER_NUMERICALRESPONSE: NO HURT

## 2024-03-29 ASSESSMENT — PAIN DESCRIPTION - ORIENTATION: ORIENTATION: LEFT

## 2024-03-29 NOTE — PLAN OF CARE
Problem: Discharge Planning  Goal: Discharge to home or other facility with appropriate resources  3/29/2024 0022 by Nori Denis, RN  Outcome: Progressing  3/28/2024 1039 by Nu Sinclair RN  Outcome: Progressing     Problem: Pain  Goal: Verbalizes/displays adequate comfort level or baseline comfort level  3/29/2024 0022 by Nori Denis, RN  Outcome: Progressing  3/28/2024 1039 by Nu Sinclair, RN  Outcome: Progressing

## 2024-03-29 NOTE — PLAN OF CARE
Problem: Discharge Planning  Goal: Discharge to home or other facility with appropriate resources  3/29/2024 0945 by Nu Sinclair, RN  Outcome: Progressing  3/29/2024 0022 by Nori Denis RN  Outcome: Progressing     Problem: Pain  Goal: Verbalizes/displays adequate comfort level or baseline comfort level  3/29/2024 0945 by Nu Sinclair, RN  Outcome: Progressing  3/29/2024 0022 by Nori Denis, RN  Outcome: Progressing

## 2024-03-29 NOTE — PROGRESS NOTES
Internal Medicine Progress Note    Patient's name: Cameron Smith  : 1965  Chief complaints (on day of admission): Abdominal Pain (Generalized abd pain, nausea, diarrhea x3 days)  Admission date: 3/27/2024  Date of service: 3/29/2024   Room: [address] MED SURG/TELE  Primary care physician: Twila Barnett MD  Reason for visit: Follow-up for SBO    Subjective  Cameron is seen lying in bed asleep in no distress.  He does easily awaken to voice.  He reports that his throat is irritated and he does have some sinus congestion, wants to know when the NG can come out.  He reports that his stomach is feeling better at this time, denies any nausea or vomiting.  He reports that he is passing gas and still having diarrhea.  In discussion with nursing and she has had essentially no output since it was placed yesterday, waiting for surgery to round this morning.  Stool for C. difficile did result positive so we will start oral vancomycin.  No other issues or concerns from nursing.    Review of Systems  Full 10 point review of systems negative unless mentioned above.    Hospital Medications  Current Facility-Administered Medications   Medication Dose Route Frequency Provider Last Rate Last Admin    pantoprazole (PROTONIX) tablet 40 mg  40 mg Oral BID AC Celso Agustin,    40 mg at 24 0825    dicyclomine (BENTYL) injection 20 mg  20 mg IntraMUSCular 4x Daily Dominic Silveira MD   20 mg at 24 0825    phenol 1.4 % mouth spray 1 spray  1 spray Mouth/Throat Q2H PRN Dominic Silveira MD        ondansetron (ZOFRAN) injection 4 mg  4 mg IntraVENous Q6H PRN Elisabeth Grider APRN - CNP        aspirin EC tablet 81 mg  81 mg Oral Daily Elisabeth Grider APRN - CNP   81 mg at 24 0825    baclofen (LIORESAL) tablet 10 mg  10 mg Oral Q12H Elisabeth Grider APRN - CNP   10 mg at 24 0825    rosuvastatin (CRESTOR) tablet 5 mg  5 mg Oral Daily Elisabeth Grider APRN - CNP   5 mg at 24 0825    sodium chloride flush 0.9 % injection  isolation  Monitor abdominal exam  General surgery consult appreciated    Hyperlipidemia  Continue statin    GERD  Given C. difficile positive stop PPI and switch to Pepcid    Follow labs   DVT prophylaxis  Please see orders for further management and care.  Discharge plan: likely return home pending diet advancement and tolerance as well as diarrhea control    The pertinent details of this case were discussed with Dr. Silveira.    Electronically signed by CORIE Grimm CNP on 3/29/2024 at 8:46 AM

## 2024-03-29 NOTE — CARE COORDINATION
Transition of Care-CDIFF+, PO Vancomycin continues, if he is being discharged on this medication, pill form will not require prior auth. Spoke to NP-plan is to advcance diet as tolerated ( currently on full liquids), monitor I/O and hopefully discharge in the next 48-72 hrs. Patient is independent from home, no anticipated needs.     Chiqui DAVISN, RN  [facility]

## 2024-03-29 NOTE — PROGRESS NOTES
small bowel obstruction (HCC)    Small bowel obstruction (HCC)       58 y.o. male gastroenteritis, C. difficile positive stool    -Okay to advance diet as tolerated from surgical point of view  -No further plans for surgical intervention  -Continue with PPI  -Please page if questions  -Antibiotics per primary team      Celso Agustin DO  General Surgery Resident, PGY-1    Electronically signed on 3/29/2024 at 8:42 AM     The patient was seen and examined and the chart was reviewed.  I agree with the assessment and plan.  The patient has C. difficile colitis.  The patient's white blood cell count is normal.  The patient denies abdominal pain.  The plan will be to advance the patient's diet and follow along for any changes associated with his C. difficile colitis.    Sandip Santamaria MD

## 2024-03-30 VITALS
OXYGEN SATURATION: 96 % | WEIGHT: 250.22 LBS | RESPIRATION RATE: 16 BRPM | HEIGHT: 72 IN | TEMPERATURE: 97.9 F | BODY MASS INDEX: 33.89 KG/M2 | DIASTOLIC BLOOD PRESSURE: 75 MMHG | HEART RATE: 61 BPM | SYSTOLIC BLOOD PRESSURE: 159 MMHG

## 2024-03-30 PROBLEM — K56.609 SMALL BOWEL OBSTRUCTION (HCC): Status: RESOLVED | Noted: 2024-03-28 | Resolved: 2024-03-30

## 2024-03-30 PROBLEM — K56.600 SMALL BOWEL OBSTRUCTION, PARTIAL (HCC): Status: RESOLVED | Noted: 2024-03-27 | Resolved: 2024-03-30

## 2024-03-30 LAB
ALBUMIN SERPL-MCNC: 3.5 G/DL (ref 3.5–5.2)
ALP SERPL-CCNC: 49 U/L (ref 40–129)
ALT SERPL-CCNC: 19 U/L (ref 0–40)
ANION GAP SERPL CALCULATED.3IONS-SCNC: 9 MMOL/L (ref 7–16)
AST SERPL-CCNC: 25 U/L (ref 0–39)
BASOPHILS # BLD: 0 K/UL (ref 0–0.2)
BASOPHILS NFR BLD: 0 % (ref 0–2)
BILIRUB SERPL-MCNC: 0.4 MG/DL (ref 0–1.2)
BUN SERPL-MCNC: 10 MG/DL (ref 6–23)
CALCIUM SERPL-MCNC: 8 MG/DL (ref 8.6–10.2)
CHLORIDE SERPL-SCNC: 102 MMOL/L (ref 98–107)
CO2 SERPL-SCNC: 28 MMOL/L (ref 22–29)
CREAT SERPL-MCNC: 0.9 MG/DL (ref 0.7–1.2)
EOSINOPHIL # BLD: 0.06 K/UL (ref 0.05–0.5)
EOSINOPHILS RELATIVE PERCENT: 1 % (ref 0–6)
ERYTHROCYTE [DISTWIDTH] IN BLOOD BY AUTOMATED COUNT: 13.7 % (ref 11.5–15)
GFR SERPL CREATININE-BSD FRML MDRD: >90 ML/MIN/1.73M2
GLUCOSE SERPL-MCNC: 86 MG/DL (ref 74–99)
HCT VFR BLD AUTO: 42.6 % (ref 37–54)
HGB BLD-MCNC: 14.2 G/DL (ref 12.5–16.5)
LYMPHOCYTES NFR BLD: 1.71 K/UL (ref 1.5–4)
LYMPHOCYTES RELATIVE PERCENT: 26 % (ref 20–42)
MCH RBC QN AUTO: 30.5 PG (ref 26–35)
MCHC RBC AUTO-ENTMCNC: 33.3 G/DL (ref 32–34.5)
MCV RBC AUTO: 91.4 FL (ref 80–99.9)
MONOCYTES NFR BLD: 0.34 K/UL (ref 0.1–0.95)
MONOCYTES NFR BLD: 5 % (ref 2–12)
NEUTROPHILS NFR BLD: 68 % (ref 43–80)
NEUTS SEG NFR BLD: 4.49 K/UL (ref 1.8–7.3)
PLATELET # BLD AUTO: 293 K/UL (ref 130–450)
PMV BLD AUTO: 9.1 FL (ref 7–12)
POTASSIUM SERPL-SCNC: 3.6 MMOL/L (ref 3.5–5)
PROT SERPL-MCNC: 5.7 G/DL (ref 6.4–8.3)
RBC # BLD AUTO: 4.66 M/UL (ref 3.8–5.8)
RBC # BLD: NORMAL 10*6/UL
SODIUM SERPL-SCNC: 139 MMOL/L (ref 132–146)
WBC OTHER # BLD: 6.6 K/UL (ref 4.5–11.5)

## 2024-03-30 PROCEDURE — 85025 COMPLETE CBC W/AUTO DIFF WBC: CPT

## 2024-03-30 PROCEDURE — 80053 COMPREHEN METABOLIC PANEL: CPT

## 2024-03-30 PROCEDURE — 6370000000 HC RX 637 (ALT 250 FOR IP): Performed by: NURSE PRACTITIONER

## 2024-03-30 PROCEDURE — 6370000000 HC RX 637 (ALT 250 FOR IP): Performed by: HOSPITALIST

## 2024-03-30 PROCEDURE — 6370000000 HC RX 637 (ALT 250 FOR IP)

## 2024-03-30 PROCEDURE — 2580000003 HC RX 258

## 2024-03-30 PROCEDURE — 6360000002 HC RX W HCPCS

## 2024-03-30 RX ORDER — DICYCLOMINE HYDROCHLORIDE 10 MG/1
20 CAPSULE ORAL
Qty: 120 CAPSULE | Refills: 3 | Status: SHIPPED | OUTPATIENT
Start: 2024-03-30

## 2024-03-30 RX ORDER — FAMOTIDINE 20 MG/1
20 TABLET, FILM COATED ORAL 2 TIMES DAILY
Qty: 60 TABLET | Refills: 3 | Status: SHIPPED | OUTPATIENT
Start: 2024-03-30

## 2024-03-30 RX ORDER — VANCOMYCIN HYDROCHLORIDE 250 MG/1
250 CAPSULE ORAL 4 TIMES DAILY
Qty: 40 CAPSULE | Refills: 0 | Status: SHIPPED | OUTPATIENT
Start: 2024-03-30 | End: 2024-04-09

## 2024-03-30 RX ADMIN — DICYCLOMINE HYDROCHLORIDE 20 MG: 10 CAPSULE ORAL at 11:33

## 2024-03-30 RX ADMIN — DICYCLOMINE HYDROCHLORIDE 20 MG: 10 CAPSULE ORAL at 06:16

## 2024-03-30 RX ADMIN — FAMOTIDINE 20 MG: 20 TABLET ORAL at 09:27

## 2024-03-30 RX ADMIN — Medication 250 MG: at 11:33

## 2024-03-30 RX ADMIN — Medication 250 MG: at 06:17

## 2024-03-30 RX ADMIN — SODIUM CHLORIDE, PRESERVATIVE FREE 10 ML: 5 INJECTION INTRAVENOUS at 09:28

## 2024-03-30 RX ADMIN — ASPIRIN 81 MG: 81 TABLET, COATED ORAL at 09:27

## 2024-03-30 RX ADMIN — ENOXAPARIN SODIUM 30 MG: 100 INJECTION SUBCUTANEOUS at 09:27

## 2024-03-30 RX ADMIN — ROSUVASTATIN CALCIUM 5 MG: 5 TABLET, FILM COATED ORAL at 09:27

## 2024-03-30 RX ADMIN — BACLOFEN 10 MG: 10 TABLET ORAL at 09:27

## 2024-03-30 ASSESSMENT — PAIN SCALES - GENERAL
PAINLEVEL_OUTOF10: 0
PAINLEVEL_OUTOF10: 0

## 2024-03-30 ASSESSMENT — PAIN SCALES - WONG BAKER: WONGBAKER_NUMERICALRESPONSE: NO HURT

## 2024-03-30 NOTE — PROGRESS NOTES
Verbal and written discharge instructions provided to pt and his wife, both voiced understanding.  Ambulatory at time of discharge off the unit with wife, declined wheelchair.

## 2024-03-30 NOTE — PLAN OF CARE
Problem: Discharge Planning  Goal: Discharge to home or other facility with appropriate resources  3/30/2024 1402 by Kathy Thompson RN  Outcome: Adequate for Discharge  3/30/2024 0115 by Tracie Velasco RN  Outcome: Progressing     Problem: Pain  Goal: Verbalizes/displays adequate comfort level or baseline comfort level  3/30/2024 1402 by Kathy Thompson RN  Outcome: Adequate for Discharge  3/30/2024 1146 by Kathy Thompson RN  Outcome: Progressing  3/30/2024 0115 by Tracie Velasco RN  Outcome: Progressing

## 2024-03-30 NOTE — DISCHARGE SUMMARY
PLACEMENT    Result Date: 3/28/2024  EXAMINATION: ONE SUPINE XRAY VIEW(S) OF THE ABDOMEN 3/28/2024 10:35 am COMPARISON: None. HISTORY: ORDERING SYSTEM PROVIDED HISTORY: Confirmation of course of NG/OG/NE tube and location of tip of tube TECHNOLOGIST PROVIDED HISTORY: Reason for exam:->Confirmation of course of NG/OG/NE tube and location of tip of tube Portable?->Yes FINDINGS: Nasogastric tube courses below the level of the diaphragm with distal tip in the expected location of the stomach.     Satisfactory position of nasogastric tube.     CT ABDOMEN PELVIS W IV CONTRAST Additional Contrast? None    Result Date: 3/27/2024  EXAM: CT Abdomen and Pelvis With Intravenous Contrast EXAM DATE/TIME: 3/27/2024 7:37 pm CLINICAL HISTORY: ORDERING SYSTEM PROVIDED HISTORY: vomiting and diarrhea  TECHNOLOGIST PROVIDED HISTORY:  Additional Contrast?->None  Reason for exam:->vomiting and diarrhea  Decision Support Exception - unselect if not a suspected or confirmed emergency medical condition->Emergency Medical Condition (MA) TECHNIQUE: Axial computed tomography images of the abdomen and pelvis with intravenous contrast.  This CT exam was performed using one or more of the following dose reduction techniques:  automated exposure control, adjustment of the mA and/or kV according to patient size, and/or use of iterative reconstruction technique. COMPARISON: No relevant prior studies available. FINDINGS: Lung bases:  No acute findings.  No mass.  No consolidation. ABDOMEN: Liver:  No acute findings.  No mass. Gallbladder and bile ducts:  No acute findings.  No calcified stones.  No ductal dilation. Pancreas:  No acute findings.  No mass.  No ductal dilation. Spleen:  No acute findings.  No splenomegaly. Adrenals:  No acute findings.  No mass. Kidneys and ureters:  No acute findings.  No solid mass.  No hydronephrosis. Stomach and bowel:  Numerous loops of moderately dilated small bowel with air-fluid levels.  Though this may represent  a partial small bowel obstruction, a definite transition point is not identified which may suggest this represents instead a ileus versus gastroenteritis.  Stomach is mostly fluid-filled but otherwise unremarkable in appearance. PELVIS: Appendix:  A normal appearing appendix is noted. Bladder:  No acute findings.  No mass. Reproductive:  Unremarkable as visualized. ABDOMEN and PELVIS: Intraperitoneal space:  No acute findings.  No free air.  No significant fluid collection. Bones/joints:  No acute fracture.  No dislocation. Soft tissues:  No acute findings. Vasculature:  No acute findings.  No abdominal aortic aneurysm. Lymph nodes:  No acute findings.  No enlarged lymph nodes.     1.  Numerous loops of moderately dilated small bowel with air-fluid levels. Though this may represent a partial small bowel obstruction, a definite transition point is not identified which may suggest this represents instead a ileus versus gastroenteritis. 2.  Stomach is mostly fluid-filled but otherwise unremarkable in appearance. 3.  A normal appearing appendix is noted. 4.  Otherwise no acute pathology noted.             DISPOSITION:  The patient's condition is stable.   The patient is being discharged to home    DISCHARGE MEDICATIONS:      Medication List        START taking these medications      dicyclomine 10 MG capsule  Commonly known as: BENTYL  Take 2 capsules by mouth 4 times daily (before meals and nightly)     famotidine 20 MG tablet  Commonly known as: PEPCID  Take 1 tablet by mouth 2 times daily     * vancomycin 50 mg/mL oral solution  Commonly known as: VANCOCIN  Take 5 mLs by mouth every 12 hours for 36 doses     * vancomycin 250 MG capsule  Commonly known as: VANCOCIN  Take 1 capsule by mouth 4 times daily for 10 days           * This list has 2 medication(s) that are the same as other medications prescribed for you. Read the directions carefully, and ask your doctor or other care provider to review them with you.

## 2024-03-30 NOTE — PROGRESS NOTES
Internal Medicine Progress Note    Patient's name: Cameron Smith  : 1965  Chief complaints (on day of admission): Abdominal Pain (Generalized abd pain, nausea, diarrhea x3 days)  Admission date: 3/27/2024  Date of service: 3/30/2024   Room: [address] MED SURG/TELE  Primary care physician: Twila Barnett MD  Reason for visit: Follow-up for SBO    Subjective  Cameron is seen lying in bed asleep in no distress.  He does easily awaken to voice.  He tells me he is feeling better today.  He remains on IV fluids.  He was found to be C. difficile positive.  We discussed this.  He tells me he was able to eat some last evening but not much. He remains on IVF.  He denies any nausea or vomiting.  He reports his abdominal pain is much improved.  He would like to see how he does today with his diet prior to making a decision on whether he would like to go home or not.  He denies any further needs at this time.    No other issues or concerns from nursing.    Review of Systems  Full 10 point review of systems negative unless mentioned above.    Hospital Medications  Current Facility-Administered Medications   Medication Dose Route Frequency Provider Last Rate Last Admin    vancomycin (VANCOCIN) 50 mg/mL oral solution 250 mg  250 mg Oral 4 times per day Jing Sinclair APRN - CNP   250 mg at 24 2330    famotidine (PEPCID) tablet 20 mg  20 mg Oral BID Jing Sinclair APRN - CNP   20 mg at 24    dicyclomine (BENTYL) capsule 20 mg  20 mg Oral 4x Daily AC & HS Dominic Silveira MD   20 mg at 24    phenol 1.4 % mouth spray 1 spray  1 spray Mouth/Throat Q2H PRN Dominic Silveira MD        ondansetron (ZOFRAN) injection 4 mg  4 mg IntraVENous Q6H PRN Elisabeth Grider APRN - CNP   4 mg at 24 1649    aspirin EC tablet 81 mg  81 mg Oral Daily Elisabeth Grider APRN - CNP   81 mg at 24 0825    baclofen (LIORESAL) tablet 10 mg  10 mg Oral Q12H Elisabeth Grider APRN - CNP   10 mg at 24    rosuvastatin

## 2024-03-30 NOTE — PLAN OF CARE
Problem: Pain  Goal: Verbalizes/displays adequate comfort level or baseline comfort level  3/30/2024 1146 by Kathy Thompson, RN  Outcome: Progressing  3/30/2024 0115 by Tracie Velasco, RN  Outcome: Progressing

## 2024-07-11 DIAGNOSIS — M72.2 PLANTAR FASCIAL FIBROMATOSIS: ICD-10-CM

## 2024-07-11 RX ORDER — NABUMETONE 500 MG/1
500 TABLET, FILM COATED ORAL DAILY
Qty: 90 TABLET | Refills: 0 | Status: CANCELLED | OUTPATIENT
Start: 2024-07-11 | End: 2024-10-09

## 2025-01-29 ENCOUNTER — OFFICE VISIT (OUTPATIENT)
Dept: FAMILY MEDICINE CLINIC | Age: 60
End: 2025-01-29

## 2025-01-29 VITALS
HEIGHT: 72 IN | WEIGHT: 242 LBS | SYSTOLIC BLOOD PRESSURE: 120 MMHG | DIASTOLIC BLOOD PRESSURE: 80 MMHG | BODY MASS INDEX: 32.78 KG/M2 | OXYGEN SATURATION: 98 % | RESPIRATION RATE: 17 BRPM | TEMPERATURE: 98.7 F | HEART RATE: 78 BPM

## 2025-01-29 DIAGNOSIS — R05.9 COUGH, UNSPECIFIED TYPE: Primary | ICD-10-CM

## 2025-01-29 LAB
INFLUENZA A ANTIBODY: POSITIVE
INFLUENZA B ANTIBODY: NEGATIVE

## 2025-01-29 RX ORDER — OMEPRAZOLE 10 MG/1
10 CAPSULE, DELAYED RELEASE ORAL DAILY
COMMUNITY
Start: 2024-12-29

## 2025-01-29 RX ORDER — GUAIFENESIN 600 MG/1
600 TABLET, EXTENDED RELEASE ORAL 2 TIMES DAILY
Qty: 30 TABLET | Refills: 0 | Status: SHIPPED | OUTPATIENT
Start: 2025-01-29 | End: 2025-02-13

## 2025-01-29 RX ORDER — DOXYCYCLINE HYCLATE 100 MG
100 TABLET ORAL 2 TIMES DAILY
Qty: 20 TABLET | Refills: 0 | Status: SHIPPED | OUTPATIENT
Start: 2025-01-29 | End: 2025-02-08

## 2025-01-29 RX ORDER — METHYLPREDNISOLONE ACETATE 40 MG/ML
40 INJECTION, SUSPENSION INTRA-ARTICULAR; INTRALESIONAL; INTRAMUSCULAR; SOFT TISSUE ONCE
Status: COMPLETED | OUTPATIENT
Start: 2025-01-29 | End: 2025-01-29

## 2025-01-29 RX ADMIN — METHYLPREDNISOLONE ACETATE 40 MG: 40 INJECTION, SUSPENSION INTRA-ARTICULAR; INTRALESIONAL; INTRAMUSCULAR; SOFT TISSUE at 16:06

## 2025-01-29 NOTE — PROGRESS NOTES
OFFICE NOTE    25  Name: Cameron Smith  :1965   Sex:male   Age:59 y.o.      SUBJECTIVE  Chief Complaint   Patient presents with    Cough     X 4 days     Chills     X 4 days     Fever     X 4 days        HPI   Comes in with flu like illness for 4 days  Review of Systems   Chills, sweats, myalgias, hacking cough      Current Outpatient Medications:     omeprazole (PRILOSEC) 10 MG delayed release capsule, Take 1 capsule by mouth daily, Disp: , Rfl:     doxycycline hyclate (VIBRA-TABS) 100 MG tablet, Take 1 tablet by mouth 2 times daily for 10 days, Disp: 20 tablet, Rfl: 0    guaiFENesin (MUCINEX) 600 MG extended release tablet, Take 1 tablet by mouth 2 times daily for 15 days, Disp: 30 tablet, Rfl: 0    dicyclomine (BENTYL) 10 MG capsule, Take 2 capsules by mouth 4 times daily (before meals and nightly), Disp: 120 capsule, Rfl: 3    baclofen (LIORESAL) 10 MG tablet, Take 1 tablet by mouth in the morning and 1 tablet in the evening., Disp: , Rfl:     rosuvastatin (CRESTOR) 5 MG tablet, Take 1 tablet by mouth daily, Disp: 90 tablet, Rfl: 1    testosterone cypionate (DEPOTESTOTERONE CYPIONATE) 100 MG/ML injection, Inject 1 mL into the muscle every 14 days for 180 days., Disp: 2 mL, Rfl: 5    Omega-3 Fatty Acids (FISH OIL) 1200 MG CAPS, Take  by mouth daily., Disp: , Rfl:     aspirin 81 MG tablet, Take 1 tablet by mouth daily, Disp: , Rfl:     famotidine (PEPCID) 20 MG tablet, Take 1 tablet by mouth 2 times daily (Patient not taking: Reported on 2025), Disp: 60 tablet, Rfl: 3    nabumetone (RELAFEN) 500 MG tablet, Take 1 tablet by mouth daily (Patient not taking: Reported on 2025), Disp: 90 tablet, Rfl: 0  No Known Allergies    Past Medical History:   Diagnosis Date    Cervical radiculopathy     Family history of prostate cancer     GERD (gastroesophageal reflux disease)     Hyperlipidemia     Impaired fasting glucose     Testosterone deficiency     TIA (transient ischemic attack)      Past